# Patient Record
Sex: FEMALE | Race: WHITE | NOT HISPANIC OR LATINO | URBAN - METROPOLITAN AREA
[De-identification: names, ages, dates, MRNs, and addresses within clinical notes are randomized per-mention and may not be internally consistent; named-entity substitution may affect disease eponyms.]

---

## 2019-08-26 ENCOUNTER — INPATIENT (INPATIENT)
Facility: HOSPITAL | Age: 59
LOS: 3 days | Discharge: HOME | DRG: 392 | End: 2019-08-30
Attending: COLON & RECTAL SURGERY | Admitting: COLON & RECTAL SURGERY
Payer: COMMERCIAL

## 2019-08-26 VITALS
HEART RATE: 79 BPM | OXYGEN SATURATION: 95 % | DIASTOLIC BLOOD PRESSURE: 76 MMHG | TEMPERATURE: 98 F | RESPIRATION RATE: 17 BRPM | SYSTOLIC BLOOD PRESSURE: 173 MMHG

## 2019-08-26 DIAGNOSIS — Z90.49 ACQUIRED ABSENCE OF OTHER SPECIFIED PARTS OF DIGESTIVE TRACT: Chronic | ICD-10-CM

## 2019-08-26 DIAGNOSIS — Z98.890 OTHER SPECIFIED POSTPROCEDURAL STATES: Chronic | ICD-10-CM

## 2019-08-26 LAB
APTT BLD: 30.1 SEC — SIGNIFICANT CHANGE UP (ref 27.5–36.3)
BLD GP AB SCN SERPL QL: NEGATIVE — SIGNIFICANT CHANGE UP
INR BLD: 1.03 — SIGNIFICANT CHANGE UP (ref 0.88–1.16)
PROTHROM AB SERPL-ACNC: 11.6 SEC — SIGNIFICANT CHANGE UP (ref 10–12.9)
RH IG SCN BLD-IMP: POSITIVE — SIGNIFICANT CHANGE UP

## 2019-08-26 PROCEDURE — 93010 ELECTROCARDIOGRAM REPORT: CPT | Mod: NC

## 2019-08-26 PROCEDURE — 71046 X-RAY EXAM CHEST 2 VIEWS: CPT | Mod: 26

## 2019-08-26 PROCEDURE — 99285 EMERGENCY DEPT VISIT HI MDM: CPT

## 2019-08-26 RX ORDER — ENOXAPARIN SODIUM 100 MG/ML
40 INJECTION SUBCUTANEOUS EVERY 24 HOURS
Refills: 0 | Status: DISCONTINUED | OUTPATIENT
Start: 2019-08-26 | End: 2019-08-30

## 2019-08-26 RX ORDER — CEFTRIAXONE 500 MG/1
1000 INJECTION, POWDER, FOR SOLUTION INTRAMUSCULAR; INTRAVENOUS ONCE
Refills: 0 | Status: COMPLETED | OUTPATIENT
Start: 2019-08-26 | End: 2019-08-26

## 2019-08-26 RX ORDER — SODIUM CHLORIDE 9 MG/ML
1000 INJECTION INTRAMUSCULAR; INTRAVENOUS; SUBCUTANEOUS ONCE
Refills: 0 | Status: COMPLETED | OUTPATIENT
Start: 2019-08-26 | End: 2019-08-26

## 2019-08-26 RX ORDER — METRONIDAZOLE 500 MG
500 TABLET ORAL ONCE
Refills: 0 | Status: COMPLETED | OUTPATIENT
Start: 2019-08-26 | End: 2019-08-26

## 2019-08-26 RX ORDER — CEFTRIAXONE 500 MG/1
1000 INJECTION, POWDER, FOR SOLUTION INTRAMUSCULAR; INTRAVENOUS EVERY 24 HOURS
Refills: 0 | Status: DISCONTINUED | OUTPATIENT
Start: 2019-08-27 | End: 2019-08-28

## 2019-08-26 RX ORDER — SODIUM CHLORIDE 9 MG/ML
1000 INJECTION, SOLUTION INTRAVENOUS
Refills: 0 | Status: DISCONTINUED | OUTPATIENT
Start: 2019-08-26 | End: 2019-08-29

## 2019-08-26 RX ORDER — METRONIDAZOLE 500 MG
500 TABLET ORAL EVERY 8 HOURS
Refills: 0 | Status: DISCONTINUED | OUTPATIENT
Start: 2019-08-27 | End: 2019-08-28

## 2019-08-26 RX ORDER — MORPHINE SULFATE 50 MG/1
4 CAPSULE, EXTENDED RELEASE ORAL ONCE
Refills: 0 | Status: DISCONTINUED | OUTPATIENT
Start: 2019-08-26 | End: 2019-08-26

## 2019-08-26 RX ORDER — ACETAMINOPHEN 500 MG
1000 TABLET ORAL ONCE
Refills: 0 | Status: COMPLETED | OUTPATIENT
Start: 2019-08-26 | End: 2019-08-26

## 2019-08-26 RX ORDER — ZOLPIDEM TARTRATE 10 MG/1
5 TABLET ORAL AT BEDTIME
Refills: 0 | Status: DISCONTINUED | OUTPATIENT
Start: 2019-08-26 | End: 2019-08-27

## 2019-08-26 RX ORDER — METRONIDAZOLE 500 MG
TABLET ORAL
Refills: 0 | Status: DISCONTINUED | OUTPATIENT
Start: 2019-08-26 | End: 2019-08-28

## 2019-08-26 RX ADMIN — CEFTRIAXONE 1000 MILLIGRAM(S): 500 INJECTION, POWDER, FOR SOLUTION INTRAMUSCULAR; INTRAVENOUS at 19:51

## 2019-08-26 RX ADMIN — SODIUM CHLORIDE 100 MILLILITER(S): 9 INJECTION, SOLUTION INTRAVENOUS at 22:37

## 2019-08-26 RX ADMIN — CEFTRIAXONE 100 MILLIGRAM(S): 500 INJECTION, POWDER, FOR SOLUTION INTRAMUSCULAR; INTRAVENOUS at 19:43

## 2019-08-26 RX ADMIN — SODIUM CHLORIDE 1000 MILLILITER(S): 9 INJECTION INTRAMUSCULAR; INTRAVENOUS; SUBCUTANEOUS at 18:13

## 2019-08-26 RX ADMIN — MORPHINE SULFATE 4 MILLIGRAM(S): 50 CAPSULE, EXTENDED RELEASE ORAL at 20:30

## 2019-08-26 RX ADMIN — MORPHINE SULFATE 4 MILLIGRAM(S): 50 CAPSULE, EXTENDED RELEASE ORAL at 19:52

## 2019-08-26 RX ADMIN — Medication 400 MILLIGRAM(S): at 22:37

## 2019-08-26 RX ADMIN — Medication 100 MILLIGRAM(S): at 18:23

## 2019-08-26 RX ADMIN — ENOXAPARIN SODIUM 40 MILLIGRAM(S): 100 INJECTION SUBCUTANEOUS at 22:37

## 2019-08-26 RX ADMIN — MORPHINE SULFATE 4 MILLIGRAM(S): 50 CAPSULE, EXTENDED RELEASE ORAL at 18:23

## 2019-08-26 NOTE — ED ADULT NURSE NOTE - CHPI ED NUR SYMPTOMS NEG
no chills/no dizziness/no fever/no tingling/no decreased eating/drinking/no weakness/no nausea/no vomiting

## 2019-08-26 NOTE — ED PROVIDER NOTE - ATTENDING CONTRIBUTION TO CARE
60 yo hx of diverticulitis w lower abd pain for one week, treated for diverticulitis, w worsening pain, no f/c, diarrhea, GI bleed sx. Out pt +diverticulitis +abscess. Well appearing, nad, nc/at, lung cta, heart reg, abd soft, nt, ext no gross deformity, no gross neuro deficits, pending surgery eval for admission, abx, hds.

## 2019-08-26 NOTE — H&P ADULT - HISTORY OF PRESENT ILLNESS
59 F no sig PMH, PSH lap ramon (5yrs ago), hx of mild diverticulitis 4 wks ago (outpt tx Cipro/Flagyl) presenting w/ infraumbilical pain associated with subjective fever/chills for 6 days. Pt states sx started 4 wks ago as mild achey periumbilical pain, outpt CT scan at that time demonstrated minor sigmoid diverticulitis, pt placed on 10d of oral Cipro/Flagyl and symptoms resolved. Pt states abdominal pain recurred last Wednesday after a trip to [place] with worsening constant infraumbilical pain, sometimes stabbing in nature and associated with chills, so pt had repeat CT scan as outpt today demonstrated severe perforated diverticulitis with small 1.5 cm abscess, and presented to ED for further evaluation. Pt denies fevers, tolerating diet with reduced appetite, denies N/V/chest pain/dyspnea, passing flatus, last BM 2 days ago (usually daily BM), voiding w/increased frequency. Pt denies personal or family hx of IBD. Denies ever having EGD, last colonoscopy over 10 yrs ago per pt for screening, per pt results were normal.     PMH: Denies  PSH: Lap cholecystectomy (5yrs ago), Panniculectomy 4 yrs ago  Allergies: Penicillin-hives  Meds: denies  Social: smokes 2 cigarettes [id number] yrs, drinks 2 alcoholic beverages daily, denies illicit drug use,   Fam hx: father with brain cancer

## 2019-08-26 NOTE — ED ADULT NURSE NOTE - OBJECTIVE STATEMENT
59 year old F patient, A+OX3, ambulatory w steady gait presents to ED with c/o of abd pain - hx of diverticulitis & sent w CAT Scan rpt that states abscess.  No distress noted.  States many episode of diverticulitis, abd pain x1week after a vacation.  BReathing easily and unlabored.  DEnies chest pain, n/v/d/f.

## 2019-08-26 NOTE — H&P ADULT - NSHPLABSRESULTS_GEN_ALL_CORE
12.6   10.38 )-----------( 130      ( 26 Aug 2019 18:01 )             37.8   08-26    137  |  101  |  17  ----------------------------<  99  5.3   |  24  |  0.77    Ca    9.6      26 Aug 2019 18:01    TPro  7.8  /  Alb  4.1  /  TBili  0.4  /  DBili  x   /  AST  25  /  ALT  16  /  AlkPhos  73  08-26      CT [place] [place]

## 2019-08-26 NOTE — H&P ADULT - NSHPPHYSICALEXAM_GEN_ALL_CORE
General: NAD, resting comfortably  Neuro: AAOX3, EOMI, PERRLA, no scleral icterus  Pulm: CTAB, Nonlabored breathing  CV: S1/S2 normal, no murmurs  Abdomen: soft, mild distention, moderate infraumbilical and umbilical tenderness, no rebound, no guarding  Extremities: WWP, No LE edema b/l

## 2019-08-26 NOTE — ED ADULT NURSE NOTE - NSIMPLEMENTINTERV_GEN_ALL_ED
Implemented All Universal Safety Interventions:  [place] to call system. Call bell, personal items and telephone within reach. Instruct patient to call for assistance. Room bathroom lighting operational. Non-slip footwear when patient is off stretcher. Physically safe environment: no spills, clutter or unnecessary equipment. Stretcher in lowest position, wheels locked, appropriate side rails in place.

## 2019-08-26 NOTE — H&P ADULT - ASSESSMENT
59 F no sig PMH, PSH lap ramon (5yrs ago), hx of mild diverticulitis 4 wks ago (outpt tx Cipro/[id number]) presenting w/ perforated sigmoid diverticulitis w/1.5cm adjacent abscess.    Admit to General Surgery, Dr. [facility]  NPO/IVF  IV Ceftriaxone/Flagyl  Serial Abdominal Exams  Lovenox/SCDs for DVT prophylaxis  AM Labs  Discussed w/ Dr. Escalera

## 2019-08-26 NOTE — ED ADULT TRIAGE NOTE - ARRIVAL INFO ADDITIONAL COMMENTS
Pt walked into ED with c/o of lower abd pain, chills. Onset was 3 weeks ago. Pt was diagnosed with diverticulits with abd for 10 days that were completed. SHe denies fever, N+V+D. Pt denies taking medication for pain. No other medical problems.

## 2019-08-26 NOTE — ED PROVIDER NOTE - OBJECTIVE STATEMENT
60 y/o female with hx of diverticulitis c/o lower abd pain. Pt states sharp lower abd pain for past 1 wk. pt states treated earlier this month for diverticulitis and sx's improved with antibiotics and then worsened. no fever or chills. no back pain. no n/v/d. no bloody stool. no urinary sx's. no further complaints.

## 2019-08-26 NOTE — ED ADULT NURSE NOTE - NS ED NURSE LEVEL OF CONSCIOUSNESS SPEECH
Cardiology Follow Up Progress Note    Date of Service  5/10/2019    Attending Physician  Lexx Crowley M.D.    Chief Complaint   Preoperative cardiovascular examination, abnormal EKG, profound anemia pending GI studies.    STEWART Bishop is a 84 y.o. female admitted 5/7/2019 with above.    Interim Events  No significant changes noted from cardiac standpoint within the past 24 hours.    Review of Systems  Review of Systems   Constitutional: Negative.  Negative for chills and fever.   HENT: Negative.  Negative for hearing loss.    Eyes: Negative.    Respiratory: Negative.  Negative for cough and shortness of breath.    Cardiovascular: Negative.  Negative for chest pain, palpitations and leg swelling.   Gastrointestinal: Negative.  Negative for abdominal pain, nausea and vomiting.   Genitourinary: Negative.  Negative for dysuria and urgency.   Musculoskeletal: Negative.  Negative for myalgias.   Skin: Negative.  Negative for rash.   Neurological: Negative.  Negative for dizziness, weakness and headaches.   Hematological: Does not bruise/bleed easily.   Psychiatric/Behavioral: Negative.  The patient is not nervous/anxious.    (ROS essentially unchanged from 05/09/19 except sleepiness)    Vital signs in last 24 hours  Temp:  [36.4 °C (97.5 °F)-37.7 °C (99.9 °F)] 37.4 °C (99.3 °F)  Pulse:  [66-84] 84  Resp:  [14-20] 18  BP: (102-135)/(55-69) 115/68  SpO2:  [95 %-98 %] 95 %    Physical Exam  Physical Exam   Constitutional: She is oriented to person, place, and time. She appears well-developed and well-nourished.   HENT:   Head: Normocephalic and atraumatic.   Eyes: Pupils are equal, round, and reactive to light. Conjunctivae are normal.   Neck: Normal range of motion. Neck supple.   Cardiovascular: Normal rate and regular rhythm.    Pulmonary/Chest: Effort normal and breath sounds normal.   Abdominal: Soft. Bowel sounds are normal.   Musculoskeletal: Normal range of motion. She exhibits no edema.   Neurological: She is  alert and oriented to person, place, and time.   Skin: Skin is warm and dry.   Psychiatric: She has a normal mood and affect.   (Physical examination essentially unchanged from 05/09/19)    Lab Review  Lab Results   Component Value Date/Time    WBC 10.9 (H) 05/10/2019 03:20 AM    RBC 2.97 (L) 05/10/2019 03:20 AM    HEMOGLOBIN 7.3 (L) 05/10/2019 03:20 AM    HEMATOCRIT 24.3 (L) 05/10/2019 03:20 AM    MCV 81.8 05/10/2019 03:20 AM    MCH 24.6 (L) 05/10/2019 03:20 AM    MCHC 30.0 (L) 05/10/2019 03:20 AM    MPV 9.8 05/10/2019 03:20 AM      Lab Results   Component Value Date/Time    SODIUM 139 05/10/2019 03:20 AM    POTASSIUM 4.3 05/10/2019 03:20 AM    CHLORIDE 112 05/10/2019 03:20 AM    CO2 20 05/10/2019 03:20 AM    GLUCOSE 82 05/10/2019 03:20 AM    BUN 9 05/10/2019 03:20 AM    CREATININE 0.81 05/10/2019 03:20 AM    CREATININE 0.8 04/06/2006 09:05 AM      Lab Results   Component Value Date/Time    ASTSGOT 36 05/07/2019 10:40 AM    ALTSGPT 22 05/07/2019 10:40 AM     Lab Results   Component Value Date/Time    CHOLSTRLTOT 159 11/20/2018 10:14 AM    LDL 44 11/20/2018 10:14 AM    HDL 99 11/20/2018 10:14 AM    TRIGLYCERIDE 82 11/20/2018 10:14 AM    TROPONINI 0.42 (H) 05/09/2019 04:53 AM   (Above labs reviewed.)     Medications reviewed.      Current Facility-Administered Medications:   •  potassium chloride SA (Kdur) tablet 40 mEq, 40 mEq, Oral, BID, Lexx Crowley M.D., 40 mEq at 05/10/19 0510  •  citalopram (CELEXA) tablet 40 mg, 40 mg, Oral, DAILY, Loren Butler M.D., 40 mg at 05/10/19 0510  •  senna-docusate (PERICOLACE or SENOKOT S) 8.6-50 MG per tablet 2 Tab, 2 Tab, Oral, BID, Stopped at 05/08/19 1800 **AND** polyethylene glycol/lytes (MIRALAX) PACKET 1 Packet, 1 Packet, Oral, QDAY PRN **AND** magnesium hydroxide (MILK OF MAGNESIA) suspension 30 mL, 30 mL, Oral, QDAY PRN **AND** bisacodyl (DULCOLAX) suppository 10 mg, 10 mg, Rectal, QDAY PRN, Loren Butler M.D.  •  acetaminophen (TYLENOL) tablet 650 mg, 650 mg, Oral, Q6HRS  PRN, Loren Butler M.D.  •  omeprazole (PRILOSEC) capsule 20 mg, 20 mg, Oral, BID, Loren Butler M.D., 20 mg at 05/10/19 0510            Cardiac Imaging and Procedures Review  CARDIAC STUDIES/PROCEDURES:      ECHOCARDIOGRAM CONCLUSIONS (05/08/19)  Normal left ventricular systolic function.  Left ventricular ejection fraction is visually estimated to be 60-65%.  Aortic sclerosis without stenosis.  Mild to moderate aortic insufficiency.  Moderately severe mitral regurgitation.  Severely dilated left atrium.  Right heart pressures are consistent with moderate pulmonary hypertension.     ECHOCARDIOGRAM CONCLUSIONS (10/12/18)  Normal left ventricular size, thickness and systolic function.  Mildly dilated left atrium.  Trace aortic insufficiency.  Right ventricular systolic pressure is estimated to be 30 mmHg.  Compared to the images of the prior study done 12/29/2014, aortic   insufficiency is slightly better, and estimated right-sided pressures are lower.     EKG performed on (05/07/19) EKG shows sinus rhythm with anterolateral ST segment depression.     Laboratory examination on (05/08/19) shows troponin I level of 0.87 ng/mL.    MYOCARDIAL PERFUSION STUDY CONCLUSIONS (05/09/19)  No evidence of significant jeopardized viable myocardium or prior myocardial infarction.   Normal left ventricular size, ejection fraction, and wall motion.  ECG INTERPRETATION  Negative stress ECG for ischemia.  (study result reviewed)    Assessment/Plan  1.  Preoperative cardiovascular evaluation with abnormal EKG and abnormal troponin level:  She is clinically doing well and her myocardial perfusion imaging study was unremarkable. She may proceed with surgery from cardiac standpoint with low risk.  2.  Mitral regurgitation: She remains clinically stable with moderately severe. We will repeat an echocardiogram as outpatient.  3.  Hypertension:  Blood pressure is well controlled.  4.  History of transient ischemic attack/stroke:  She had no  recurrence of stroke-like symptoms.  5.  Gastrointestinal bleed/anemia:  Plan for EGD, colonoscopy in the near future.    Thank you for allowing me to participate in the care of this patient.  We will sign off and follow up in clinic.    Please contact me with any questions.    Teofilo Curtis M.D.   Cardiologist, [facility]  [phone]       Speaking Coherently

## 2019-08-26 NOTE — ED PROVIDER NOTE - CLINICAL SUMMARY MEDICAL DECISION MAKING FREE TEXT BOX
lower abd pain with outpt ct scan done today showing severe diverticulitis with small abscess. surgery consulted. labs, ecg and plan for admission. cefriaxone and flagyl given

## 2019-08-27 LAB
ANION GAP SERPL CALC-SCNC: 6 MMOL/L — SIGNIFICANT CHANGE UP (ref 5–17)
APPEARANCE UR: CLEAR — SIGNIFICANT CHANGE UP
BACTERIA # UR AUTO: PRESENT /HPF
BILIRUB UR-MCNC: NEGATIVE — SIGNIFICANT CHANGE UP
BLD GP AB SCN SERPL QL: NEGATIVE — SIGNIFICANT CHANGE UP
BUN SERPL-MCNC: 13 MG/DL — SIGNIFICANT CHANGE UP (ref 7–23)
CALCIUM SERPL-MCNC: 8.4 MG/DL — SIGNIFICANT CHANGE UP (ref 8.4–10.5)
CHLORIDE SERPL-SCNC: 109 MMOL/L — HIGH (ref 96–108)
CO2 SERPL-SCNC: 25 MMOL/L — SIGNIFICANT CHANGE UP (ref 22–31)
COLOR SPEC: YELLOW — SIGNIFICANT CHANGE UP
CREAT SERPL-MCNC: 0.88 MG/DL — SIGNIFICANT CHANGE UP (ref 0.5–1.3)
DIFF PNL FLD: NEGATIVE — SIGNIFICANT CHANGE UP
EPI CELLS # UR: SIGNIFICANT CHANGE UP /HPF (ref 0–5)
GLUCOSE SERPL-MCNC: 100 MG/DL — HIGH (ref 70–99)
GLUCOSE UR QL: NEGATIVE — SIGNIFICANT CHANGE UP
HCT VFR BLD CALC: 34 % — LOW (ref 34.5–45)
HCV AB S/CO SERPL IA: 0.12 S/CO — SIGNIFICANT CHANGE UP
HCV AB SERPL-IMP: SIGNIFICANT CHANGE UP
HGB BLD-MCNC: 10.9 G/DL — LOW (ref 11.5–15.5)
KETONES UR-MCNC: NEGATIVE — SIGNIFICANT CHANGE UP
LEUKOCYTE ESTERASE UR-ACNC: NEGATIVE — SIGNIFICANT CHANGE UP
MAGNESIUM SERPL-MCNC: 2 MG/DL — SIGNIFICANT CHANGE UP (ref 1.6–2.6)
MCHC RBC-ENTMCNC: 32.1 GM/DL — SIGNIFICANT CHANGE UP (ref 32–36)
MCHC RBC-ENTMCNC: 32.3 PG — SIGNIFICANT CHANGE UP (ref 27–34)
MCV RBC AUTO: 100.9 FL — HIGH (ref 80–100)
NITRITE UR-MCNC: NEGATIVE — SIGNIFICANT CHANGE UP
NRBC # BLD: 0 /100 WBCS — SIGNIFICANT CHANGE UP (ref 0–0)
PH UR: 5.5 — SIGNIFICANT CHANGE UP (ref 5–8)
PHOSPHATE SERPL-MCNC: 4.2 MG/DL — SIGNIFICANT CHANGE UP (ref 2.5–4.5)
PLATELET # BLD AUTO: 115 K/UL — LOW (ref 150–400)
POTASSIUM SERPL-MCNC: 3.9 MMOL/L — SIGNIFICANT CHANGE UP (ref 3.5–5.3)
POTASSIUM SERPL-SCNC: 3.9 MMOL/L — SIGNIFICANT CHANGE UP (ref 3.5–5.3)
PROT UR-MCNC: ABNORMAL MG/DL
RBC # BLD: 3.37 M/UL — LOW (ref 3.8–5.2)
RBC # FLD: 12.6 % — SIGNIFICANT CHANGE UP (ref 10.3–14.5)
RBC CASTS # UR COMP ASSIST: < 5 /HPF — SIGNIFICANT CHANGE UP
RH IG SCN BLD-IMP: POSITIVE — SIGNIFICANT CHANGE UP
SODIUM SERPL-SCNC: 140 MMOL/L — SIGNIFICANT CHANGE UP (ref 135–145)
SP GR SPEC: 1.01 — SIGNIFICANT CHANGE UP (ref 1–1.03)
UROBILINOGEN FLD QL: 0.2 E.U./DL — SIGNIFICANT CHANGE UP
WBC # BLD: 6.31 K/UL — SIGNIFICANT CHANGE UP (ref 3.8–10.5)
WBC # FLD AUTO: 6.31 K/UL — SIGNIFICANT CHANGE UP (ref 3.8–10.5)
WBC UR QL: < 5 /HPF — SIGNIFICANT CHANGE UP

## 2019-08-27 PROCEDURE — 74019 RADEX ABDOMEN 2 VIEWS: CPT | Mod: 26

## 2019-08-27 RX ORDER — HYDROMORPHONE HYDROCHLORIDE 2 MG/ML
1 INJECTION INTRAMUSCULAR; INTRAVENOUS; SUBCUTANEOUS EVERY 4 HOURS
Refills: 0 | Status: DISCONTINUED | OUTPATIENT
Start: 2019-08-27 | End: 2019-08-27

## 2019-08-27 RX ORDER — HYDROMORPHONE HYDROCHLORIDE 2 MG/ML
0.5 INJECTION INTRAMUSCULAR; INTRAVENOUS; SUBCUTANEOUS EVERY 4 HOURS
Refills: 0 | Status: DISCONTINUED | OUTPATIENT
Start: 2019-08-27 | End: 2019-08-30

## 2019-08-27 RX ORDER — HYDROMORPHONE HYDROCHLORIDE 2 MG/ML
0.5 INJECTION INTRAMUSCULAR; INTRAVENOUS; SUBCUTANEOUS EVERY 6 HOURS
Refills: 0 | Status: DISCONTINUED | OUTPATIENT
Start: 2019-08-27 | End: 2019-08-27

## 2019-08-27 RX ORDER — ONDANSETRON 8 MG/1
8 TABLET, FILM COATED ORAL EVERY 6 HOURS
Refills: 0 | Status: DISCONTINUED | OUTPATIENT
Start: 2019-08-27 | End: 2019-08-30

## 2019-08-27 RX ORDER — KETOROLAC TROMETHAMINE 30 MG/ML
15 SYRINGE (ML) INJECTION EVERY 6 HOURS
Refills: 0 | Status: DISCONTINUED | OUTPATIENT
Start: 2019-08-27 | End: 2019-08-30

## 2019-08-27 RX ORDER — SODIUM CHLORIDE 9 MG/ML
250 INJECTION, SOLUTION INTRAVENOUS ONCE
Refills: 0 | Status: COMPLETED | OUTPATIENT
Start: 2019-08-27 | End: 2019-08-27

## 2019-08-27 RX ORDER — DIAZEPAM 5 MG
5 TABLET ORAL AT BEDTIME
Refills: 0 | Status: DISCONTINUED | OUTPATIENT
Start: 2019-08-27 | End: 2019-08-29

## 2019-08-27 RX ORDER — KETOROLAC TROMETHAMINE 30 MG/ML
30 SYRINGE (ML) INJECTION EVERY 6 HOURS
Refills: 0 | Status: DISCONTINUED | OUTPATIENT
Start: 2019-08-27 | End: 2019-08-27

## 2019-08-27 RX ORDER — HYDROMORPHONE HYDROCHLORIDE 2 MG/ML
0.3 INJECTION INTRAMUSCULAR; INTRAVENOUS; SUBCUTANEOUS EVERY 4 HOURS
Refills: 0 | Status: DISCONTINUED | OUTPATIENT
Start: 2019-08-27 | End: 2019-08-30

## 2019-08-27 RX ORDER — DIAZEPAM 5 MG
2 TABLET ORAL ONCE
Refills: 0 | Status: DISCONTINUED | OUTPATIENT
Start: 2019-08-27 | End: 2019-08-27

## 2019-08-27 RX ORDER — METOCLOPRAMIDE HCL 10 MG
10 TABLET ORAL ONCE
Refills: 0 | Status: COMPLETED | OUTPATIENT
Start: 2019-08-27 | End: 2019-08-27

## 2019-08-27 RX ORDER — ACETAMINOPHEN 500 MG
1000 TABLET ORAL ONCE
Refills: 0 | Status: COMPLETED | OUTPATIENT
Start: 2019-08-27 | End: 2019-08-27

## 2019-08-27 RX ORDER — ONDANSETRON 8 MG/1
4 TABLET, FILM COATED ORAL EVERY 6 HOURS
Refills: 0 | Status: DISCONTINUED | OUTPATIENT
Start: 2019-08-27 | End: 2019-08-27

## 2019-08-27 RX ADMIN — Medication 15 MILLIGRAM(S): at 16:46

## 2019-08-27 RX ADMIN — Medication 1000 MILLIGRAM(S): at 00:01

## 2019-08-27 RX ADMIN — SODIUM CHLORIDE 100 MILLILITER(S): 9 INJECTION, SOLUTION INTRAVENOUS at 17:26

## 2019-08-27 RX ADMIN — Medication 15 MILLIGRAM(S): at 21:13

## 2019-08-27 RX ADMIN — Medication 400 MILLIGRAM(S): at 16:47

## 2019-08-27 RX ADMIN — HYDROMORPHONE HYDROCHLORIDE 1 MILLIGRAM(S): 2 INJECTION INTRAMUSCULAR; INTRAVENOUS; SUBCUTANEOUS at 10:30

## 2019-08-27 RX ADMIN — Medication 30 MILLIGRAM(S): at 09:46

## 2019-08-27 RX ADMIN — Medication 100 MILLIGRAM(S): at 09:46

## 2019-08-27 RX ADMIN — Medication 5 MILLIGRAM(S): at 21:14

## 2019-08-27 RX ADMIN — Medication 400 MILLIGRAM(S): at 06:25

## 2019-08-27 RX ADMIN — ENOXAPARIN SODIUM 40 MILLIGRAM(S): 100 INJECTION SUBCUTANEOUS at 18:35

## 2019-08-27 RX ADMIN — SODIUM CHLORIDE 1500 MILLILITER(S): 9 INJECTION, SOLUTION INTRAVENOUS at 17:25

## 2019-08-27 RX ADMIN — Medication 1000 MILLIGRAM(S): at 22:42

## 2019-08-27 RX ADMIN — Medication 15 MILLIGRAM(S): at 17:30

## 2019-08-27 RX ADMIN — Medication 100 MILLIGRAM(S): at 02:09

## 2019-08-27 RX ADMIN — Medication 1000 MILLIGRAM(S): at 06:52

## 2019-08-27 RX ADMIN — ONDANSETRON 4 MILLIGRAM(S): 8 TABLET, FILM COATED ORAL at 16:47

## 2019-08-27 RX ADMIN — Medication 2 MILLIGRAM(S): at 03:41

## 2019-08-27 RX ADMIN — CEFTRIAXONE 100 MILLIGRAM(S): 500 INJECTION, POWDER, FOR SOLUTION INTRAMUSCULAR; INTRAVENOUS at 19:35

## 2019-08-27 RX ADMIN — Medication 1000 MILLIGRAM(S): at 17:30

## 2019-08-27 RX ADMIN — Medication 100 MILLIGRAM(S): at 18:34

## 2019-08-27 RX ADMIN — Medication 10 MILLIGRAM(S): at 17:25

## 2019-08-27 RX ADMIN — ZOLPIDEM TARTRATE 5 MILLIGRAM(S): 10 TABLET ORAL at 00:11

## 2019-08-27 RX ADMIN — HYDROMORPHONE HYDROCHLORIDE 1 MILLIGRAM(S): 2 INJECTION INTRAMUSCULAR; INTRAVENOUS; SUBCUTANEOUS at 09:46

## 2019-08-27 RX ADMIN — Medication 30 MILLIGRAM(S): at 10:30

## 2019-08-27 RX ADMIN — Medication 400 MILLIGRAM(S): at 21:13

## 2019-08-27 RX ADMIN — Medication 15 MILLIGRAM(S): at 22:42

## 2019-08-27 NOTE — PROGRESS NOTE ADULT - ASSESSMENT
59F past hx of lap ramon (5yrs ago), hx of mild diverticulitis 4 wks ago (outpt tx Cipro/[id number]) presenting w/ perforated sigmoid diverticulitis w/1.5cm adjacent abscess.    NPO/IVF  IV Ceftriaxone/Flagyl  Lovenox/SCDs for DVT prophylaxis  AM Labs  Serial abdominal exams.

## 2019-08-27 NOTE — CHART NOTE - NSCHARTNOTEFT_GEN_A_CORE
Chart note for Serial Abdominal Exam:     Subjective:   Pt seen and examined at bedside. Pt is doing well, resting comfortably on bed. Pain controlled. No nausea or vomiting. No complaints at this time.    Vital Signs Last 24 Hrs  T(C): 35.9 (26 Aug 2019 21:43), Max: 36.8 (26 Aug 2019 17:26)  T(F): 96.7 (26 Aug 2019 21:43), Max: 98.3 (26 Aug 2019 17:26)  HR: 68 (26 Aug 2019 21:43) (67 - 79)  BP: 143/83 (26 Aug 2019 21:43) (141/89 - 173/76)  RR: 18 (26 Aug 2019 21:43) (17 - 18)  SpO2: 67% (26 Aug 2019 21:43) (67% - 95%)    Physical Exam:  General: NAD  Pulmonary: Nonlabored breathing, no respiratory distress  Abdominal: soft, mildly distended, mild tenderness in the LLQ and infraumbilically, no rebound or guarding.   Extremities: warm, well perfused. SCDs in place.     Assessment:  59 F no sig PMH, PSH lap ramon (5yrs ago), hx of mild diverticulitis 4 wks ago (outpt tx Cipro/[id number]) presenting w/ perforated sigmoid diverticulitis w/1.5cm adjacent abscess.    Plan:  NPO/IVF  IV Ceftriaxone/Flagyl  Lovenox/SCDs for DVT prophylaxis  AM Labs  Will continue to monitor and perform serial abdominal exams

## 2019-08-27 NOTE — PROGRESS NOTE ADULT - SUBJECTIVE AND OBJECTIVE BOX
SUBJECTIVE: Pt seen & examined at bedside by chief resident. Pt states that she did not get much sleep last night because she was not given the correct medications for her sleep. Does not believe that the pain was a major component of her insomnia - states that the pain is well controlled. Denies any N/V and says that she is hungry. Has not been passing flatus and no BM.    Vital Signs Last 24 Hrs  T(C): 36.7 (27 Aug 2019 05:26), Max: 36.8 (26 Aug 2019 17:26)  T(F): 98 (27 Aug 2019 05:26), Max: 98.3 (26 Aug 2019 17:26)  HR: 62 (27 Aug 2019 05:26) (62 - 79)  BP: 100/66 (27 Aug 2019 05:26) (100/66 - 173/76)  BP(mean): --  RR: 18 (27 Aug 2019 05:26) (17 - 18)  SpO2: 96% (27 Aug 2019 05:26) (67% - 96%)    General: NAD  Pulmonary: Nonlabored breathing, no respiratory distress  Abdominal: soft, mildly distended, mild tenderness in the LLQ and infraumbilically, no rebound or guarding.   Extremities: warm, well perfused. SCDs in place.    I&O's Detail    26 Aug 2019 07:01  -  27 Aug 2019 06:10  --------------------------------------------------------  IN:    IV PiggyBack: 100 mL  Total IN: 100 mL    OUT:  Total OUT: 0 mL    Total NET: 100 mL      08-26    137  |  101  |  17  ----------------------------<  99  5.3   |  24  |  0.77    Ca    9.6      26 Aug 2019 18:01    TPro  7.8  /  Alb  4.1  /  TBili  0.4  /  DBili  x   /  AST  25  /  ALT  16  /  AlkPhos  73  08-26                            12.6   10.38 )-----------( 130      ( 26 Aug 2019 18:01 )             37.8 SUBJECTIVE: Pt seen & examined at bedside by chief resident. Pt states that she did not get much sleep last night because she was not given the correct medications for her sleep. Does not believe that the pain was a major component of her insomnia - states that the pain is well controlled. Also has a headache this AM. Denies any N/V and says that she is hungry. Has not been passing flatus and no BM.    Vital Signs Last 24 Hrs  T(C): 36.7 (27 Aug 2019 05:26), Max: 36.8 (26 Aug 2019 17:26)  T(F): 98 (27 Aug 2019 05:26), Max: 98.3 (26 Aug 2019 17:26)  HR: 62 (27 Aug 2019 05:26) (62 - 79)  BP: 100/66 (27 Aug 2019 05:26) (100/66 - 173/76)  BP(mean): --  RR: 18 (27 Aug 2019 05:26) (17 - 18)  SpO2: 96% (27 Aug 2019 05:26) (67% - 96%)    General: NAD  Pulmonary: Nonlabored breathing, no respiratory distress  Abdominal: soft, mildly distended, mild tenderness in the LLQ and infraumbilically, no rebound or guarding.   Extremities: warm, well perfused. SCDs in place.    I&O's Detail    26 Aug 2019 07:01  -  27 Aug 2019 06:10  --------------------------------------------------------  IN:    IV PiggyBack: 100 mL  Total IN: 100 mL    OUT:  Total OUT: 0 mL    Total NET: 100 mL      08-26    137  |  101  |  17  ----------------------------<  99  5.3   |  24  |  0.77    Ca    9.6      26 Aug 2019 18:01    TPro  7.8  /  Alb  4.1  /  TBili  0.4  /  DBili  x   /  AST  25  /  ALT  16  /  AlkPhos  73  08-26                            12.6   10.38 )-----------( 130      ( 26 Aug 2019 18:01 )             37.8

## 2019-08-28 LAB
ANION GAP SERPL CALC-SCNC: 12 MMOL/L — SIGNIFICANT CHANGE UP (ref 5–17)
BUN SERPL-MCNC: 13 MG/DL — SIGNIFICANT CHANGE UP (ref 7–23)
CALCIUM SERPL-MCNC: 8.7 MG/DL — SIGNIFICANT CHANGE UP (ref 8.4–10.5)
CHLORIDE SERPL-SCNC: 108 MMOL/L — SIGNIFICANT CHANGE UP (ref 96–108)
CO2 SERPL-SCNC: 22 MMOL/L — SIGNIFICANT CHANGE UP (ref 22–31)
CREAT SERPL-MCNC: 0.75 MG/DL — SIGNIFICANT CHANGE UP (ref 0.5–1.3)
GLUCOSE SERPL-MCNC: 92 MG/DL — SIGNIFICANT CHANGE UP (ref 70–99)
HCT VFR BLD CALC: 32.6 % — LOW (ref 34.5–45)
HGB BLD-MCNC: 10.5 G/DL — LOW (ref 11.5–15.5)
MAGNESIUM SERPL-MCNC: 2 MG/DL — SIGNIFICANT CHANGE UP (ref 1.6–2.6)
MCHC RBC-ENTMCNC: 32.2 GM/DL — SIGNIFICANT CHANGE UP (ref 32–36)
MCHC RBC-ENTMCNC: 32.3 PG — SIGNIFICANT CHANGE UP (ref 27–34)
MCV RBC AUTO: 100.3 FL — HIGH (ref 80–100)
NRBC # BLD: 0 /100 WBCS — SIGNIFICANT CHANGE UP (ref 0–0)
PHOSPHATE SERPL-MCNC: 4.4 MG/DL — SIGNIFICANT CHANGE UP (ref 2.5–4.5)
PLATELET # BLD AUTO: 127 K/UL — LOW (ref 150–400)
POTASSIUM SERPL-MCNC: 4 MMOL/L — SIGNIFICANT CHANGE UP (ref 3.5–5.3)
POTASSIUM SERPL-SCNC: 4 MMOL/L — SIGNIFICANT CHANGE UP (ref 3.5–5.3)
RBC # BLD: 3.25 M/UL — LOW (ref 3.8–5.2)
RBC # FLD: 12.4 % — SIGNIFICANT CHANGE UP (ref 10.3–14.5)
SODIUM SERPL-SCNC: 142 MMOL/L — SIGNIFICANT CHANGE UP (ref 135–145)
WBC # BLD: 7.1 K/UL — SIGNIFICANT CHANGE UP (ref 3.8–10.5)
WBC # FLD AUTO: 7.1 K/UL — SIGNIFICANT CHANGE UP (ref 3.8–10.5)

## 2019-08-28 PROCEDURE — 95180 RAPID DESENSITIZATION: CPT | Mod: GC

## 2019-08-28 RX ORDER — METOCLOPRAMIDE HCL 10 MG
10 TABLET ORAL ONCE
Refills: 0 | Status: COMPLETED | OUTPATIENT
Start: 2019-08-28 | End: 2019-08-28

## 2019-08-28 RX ORDER — SODIUM CHLORIDE 9 MG/ML
0.5 INJECTION INTRAMUSCULAR; INTRAVENOUS; SUBCUTANEOUS ONCE
Refills: 0 | Status: DISCONTINUED | OUTPATIENT
Start: 2019-08-28 | End: 2019-08-30

## 2019-08-28 RX ORDER — DIAZEPAM 5 MG
5 TABLET ORAL AT BEDTIME
Refills: 0 | Status: DISCONTINUED | OUTPATIENT
Start: 2019-08-28 | End: 2019-08-28

## 2019-08-28 RX ORDER — MEROPENEM 1 G/30ML
1000 INJECTION INTRAVENOUS EVERY 8 HOURS
Refills: 0 | Status: DISCONTINUED | OUTPATIENT
Start: 2019-08-28 | End: 2019-08-30

## 2019-08-28 RX ORDER — ACETAMINOPHEN 500 MG
1000 TABLET ORAL ONCE
Refills: 0 | Status: COMPLETED | OUTPATIENT
Start: 2019-08-28 | End: 2019-08-28

## 2019-08-28 RX ORDER — SCOPALAMINE 1 MG/3D
1 PATCH, EXTENDED RELEASE TRANSDERMAL
Refills: 0 | Status: DISCONTINUED | OUTPATIENT
Start: 2019-08-28 | End: 2019-08-30

## 2019-08-28 RX ORDER — BENZYLPENICILLOYL POLYLYSINE 0.25 ML
0.25 AMPUL (ML) INTRADERMAL ONCE
Refills: 0 | Status: DISCONTINUED | OUTPATIENT
Start: 2019-08-28 | End: 2019-08-30

## 2019-08-28 RX ORDER — PENICILLIN G POTASSIUM 5000000 [IU]/1
1 POWDER, FOR SOLUTION INTRAMUSCULAR; INTRAPLEURAL; INTRATHECAL; INTRAVENOUS ONCE
Refills: 0 | Status: DISCONTINUED | OUTPATIENT
Start: 2019-08-28 | End: 2019-08-30

## 2019-08-28 RX ADMIN — SCOPALAMINE 1 PATCH: 1 PATCH, EXTENDED RELEASE TRANSDERMAL at 13:05

## 2019-08-28 RX ADMIN — Medication 15 MILLIGRAM(S): at 17:30

## 2019-08-28 RX ADMIN — Medication 15 MILLIGRAM(S): at 22:36

## 2019-08-28 RX ADMIN — Medication 10 MILLIGRAM(S): at 14:34

## 2019-08-28 RX ADMIN — Medication 400 MILLIGRAM(S): at 13:07

## 2019-08-28 RX ADMIN — Medication 15 MILLIGRAM(S): at 05:40

## 2019-08-28 RX ADMIN — Medication 15 MILLIGRAM(S): at 10:40

## 2019-08-28 RX ADMIN — ENOXAPARIN SODIUM 40 MILLIGRAM(S): 100 INJECTION SUBCUTANEOUS at 18:16

## 2019-08-28 RX ADMIN — ONDANSETRON 8 MILLIGRAM(S): 8 TABLET, FILM COATED ORAL at 12:24

## 2019-08-28 RX ADMIN — Medication 15 MILLIGRAM(S): at 22:07

## 2019-08-28 RX ADMIN — Medication 5 MILLIGRAM(S): at 22:07

## 2019-08-28 RX ADMIN — Medication 15 MILLIGRAM(S): at 17:45

## 2019-08-28 RX ADMIN — Medication 1000 MILLIGRAM(S): at 13:22

## 2019-08-28 RX ADMIN — Medication 15 MILLIGRAM(S): at 05:54

## 2019-08-28 RX ADMIN — ONDANSETRON 8 MILLIGRAM(S): 8 TABLET, FILM COATED ORAL at 00:51

## 2019-08-28 RX ADMIN — Medication 100 MILLIGRAM(S): at 10:40

## 2019-08-28 RX ADMIN — ONDANSETRON 8 MILLIGRAM(S): 8 TABLET, FILM COATED ORAL at 06:57

## 2019-08-28 RX ADMIN — Medication 100 MILLIGRAM(S): at 02:16

## 2019-08-28 RX ADMIN — MEROPENEM 100 MILLIGRAM(S): 1 INJECTION INTRAVENOUS at 22:07

## 2019-08-28 RX ADMIN — SCOPALAMINE 1 PATCH: 1 PATCH, EXTENDED RELEASE TRANSDERMAL at 19:00

## 2019-08-28 RX ADMIN — Medication 15 MILLIGRAM(S): at 10:55

## 2019-08-28 NOTE — CHART NOTE - NSCHARTNOTEFT_GEN_A_CORE
Penicillin Allergy Skin Testing    DX: Reported PCN drug allergy -Reported as rash/hives at age 4yrs old    no hx of colmenares reinier/TEN  no steroids/antihistamines recieved in last 48hrs      Exam:  Gen: NAD  HEENT: MMM, no stridor, lip swelling, facial edema  Lungs: CTA bl, no wheeze, rales, rhonchi  Skin Exam: Skin no lesions/rash/urticaria  Ext: No C/C/E    1) Informed consent was obtained and time-out was performed.    2) Scratch test: _____positive__________  * Histamine: 10 mm   * Saline diluent: 0 mm  * Pre-Pen: 5 mm  * Penicillin  mm    3) Intradermal test: ___deferred____________  * Saline diluent: 0 mm  * Pre-pen #1: 0 mm (growth beyond original size)  * Pre-pen #2: 0 mm ( growth beyond original size)  * Penicillin G #1: 0 mm (growth beyond original size)  * Penicillin G #2: 0 mm ( growth beyond original size)       Results: positive Penicillin Allergy Skin Testing    Results were explained to the patient and communicated with primary team.       Procedure was performed by Dr. Thong Hickey    Time face to face 60 minutes with >50% on counseling and coordination of care.

## 2019-08-28 NOTE — PROGRESS NOTE ADULT - SUBJECTIVE AND OBJECTIVE BOX
SUBJECTIVE: Patient seen and examined bedside by chief resident.  nausea has improved since yesterday however is still slightly present. last nausea medication was before she went to bed. no vomiting today. some abdominal pain upon movement. has ambulated to bathroom. +BM    cefTRIAXone   IVPB 1000 milliGRAM(s) IV Intermittent every 24 hours  enoxaparin Injectable 40 milliGRAM(s) SubCutaneous every 24 hours  metroNIDAZOLE  IVPB      metroNIDAZOLE  IVPB 500 milliGRAM(s) IV Intermittent every 8 hours      Vital Signs Last 24 Hrs  T(C): 36.7 (28 Aug 2019 05:51), Max: 37.2 (27 Aug 2019 09:59)  T(F): 98 (28 Aug 2019 05:51), Max: 99 (27 Aug 2019 09:59)  HR: 72 (28 Aug 2019 05:51) (60 - 72)  BP: 153/86 (28 Aug 2019 05:51) (121/78 - 155/88)  BP(mean): --  RR: 17 (28 Aug 2019 05:51) (17 - 18)  SpO2: 97% (28 Aug 2019 05:51) (96% - 99%)  I&O's Detail    27 Aug 2019 07:01  -  28 Aug 2019 07:00  --------------------------------------------------------  IN:  Total IN: 0 mL    OUT:    Voided: 100 mL  Total OUT: 100 mL    Total NET: -100 mL          General: NAD, resting comfortably in bed  C/V: NSR  Pulm: Nonlabored breathing, no respiratory distress  Abd: soft, ND, LLQ TTP however exam improving  Extrem: WWP, no edema, SCDs in place        LABS:                        10.5   7.10  )-----------( 127      ( 28 Aug 2019 05:58 )             32.6     08-    142  |  108  |  13  ----------------------------<  92  4.0   |  22  |  0.75    Ca    8.7      28 Aug 2019 05:58  Phos  4.4       Mg     2.0         TPro  7.8  /  Alb  4.1  /  TBili  0.4  /  DBili  x   /  AST  25  /  ALT  16  /  AlkPhos  73  -    PT/INR - ( 26 Aug 2019 18:20 )   PT: 11.6 sec;   INR: 1.03          PTT - ( 26 Aug 2019 18:20 )  PTT:30.1 sec  Urinalysis Basic - ( 27 Aug 2019 06:46 )    Color: Yellow / Appearance: Clear / S.015 / pH: x  Gluc: x / Ketone: NEGATIVE  / Bili: Negative / Urobili: 0.2 E.U./dL   Blood: x / Protein: Trace mg/dL / Nitrite: NEGATIVE   Leuk Esterase: NEGATIVE / RBC: < 5 /HPF / WBC < 5 /HPF   Sq Epi: x / Non Sq Epi: 0-5 /HPF / Bacteria: Present /HPF        RADIOLOGY & ADDITIONAL STUDIES:

## 2019-08-28 NOTE — PROGRESS NOTE ADULT - SUBJECTIVE AND OBJECTIVE BOX
some improvement  but nauseous  AVSS  ABD soft  less tender in LLQ    alergy testing  OOB  Incentive spirometer  NPO for now

## 2019-08-28 NOTE — PROGRESS NOTE ADULT - ASSESSMENT
59 F no sig PMH, PSH lap ramon (5yrs ago), hx of mild diverticulitis 4 wks ago (outpt tx Cipro/[id number]) presenting w/ perforated sigmoid diverticulitis w/1.5cm adjacent abscess.    NPO/IVF  IV Ceftriaxone/Flagyl  Serial Abdominal Exams  Lovenox/SCDs for DVT prophylaxis  AM Labs

## 2019-08-29 LAB
ANION GAP SERPL CALC-SCNC: 14 MMOL/L — SIGNIFICANT CHANGE UP (ref 5–17)
BUN SERPL-MCNC: 11 MG/DL — SIGNIFICANT CHANGE UP (ref 7–23)
CALCIUM SERPL-MCNC: 8.5 MG/DL — SIGNIFICANT CHANGE UP (ref 8.4–10.5)
CHLORIDE SERPL-SCNC: 107 MMOL/L — SIGNIFICANT CHANGE UP (ref 96–108)
CO2 SERPL-SCNC: 20 MMOL/L — LOW (ref 22–31)
CREAT SERPL-MCNC: 0.74 MG/DL — SIGNIFICANT CHANGE UP (ref 0.5–1.3)
GLUCOSE SERPL-MCNC: 88 MG/DL — SIGNIFICANT CHANGE UP (ref 70–99)
HCT VFR BLD CALC: 32.4 % — LOW (ref 34.5–45)
HGB BLD-MCNC: 10.9 G/DL — LOW (ref 11.5–15.5)
MAGNESIUM SERPL-MCNC: 1.8 MG/DL — SIGNIFICANT CHANGE UP (ref 1.6–2.6)
MCHC RBC-ENTMCNC: 33 PG — SIGNIFICANT CHANGE UP (ref 27–34)
MCHC RBC-ENTMCNC: 33.6 GM/DL — SIGNIFICANT CHANGE UP (ref 32–36)
MCV RBC AUTO: 98.2 FL — SIGNIFICANT CHANGE UP (ref 80–100)
NRBC # BLD: 0 /100 WBCS — SIGNIFICANT CHANGE UP (ref 0–0)
PHOSPHATE SERPL-MCNC: 3.5 MG/DL — SIGNIFICANT CHANGE UP (ref 2.5–4.5)
PLATELET # BLD AUTO: 147 K/UL — LOW (ref 150–400)
POTASSIUM SERPL-MCNC: 4.1 MMOL/L — SIGNIFICANT CHANGE UP (ref 3.5–5.3)
POTASSIUM SERPL-SCNC: 4.1 MMOL/L — SIGNIFICANT CHANGE UP (ref 3.5–5.3)
RBC # BLD: 3.3 M/UL — LOW (ref 3.8–5.2)
RBC # FLD: 12.5 % — SIGNIFICANT CHANGE UP (ref 10.3–14.5)
SODIUM SERPL-SCNC: 141 MMOL/L — SIGNIFICANT CHANGE UP (ref 135–145)
WBC # BLD: 10.86 K/UL — HIGH (ref 3.8–10.5)
WBC # FLD AUTO: 10.86 K/UL — HIGH (ref 3.8–10.5)

## 2019-08-29 PROCEDURE — 99222 1ST HOSP IP/OBS MODERATE 55: CPT | Mod: GC

## 2019-08-29 RX ORDER — ACETAMINOPHEN 500 MG
1000 TABLET ORAL ONCE
Refills: 0 | Status: COMPLETED | OUTPATIENT
Start: 2019-08-29 | End: 2019-08-29

## 2019-08-29 RX ORDER — TRAZODONE HCL 50 MG
400 TABLET ORAL AT BEDTIME
Refills: 0 | Status: DISCONTINUED | OUTPATIENT
Start: 2019-08-29 | End: 2019-08-30

## 2019-08-29 RX ORDER — TRAZODONE HCL 50 MG
400 TABLET ORAL
Qty: 0 | Refills: 0 | DISCHARGE

## 2019-08-29 RX ADMIN — ONDANSETRON 8 MILLIGRAM(S): 8 TABLET, FILM COATED ORAL at 02:36

## 2019-08-29 RX ADMIN — MEROPENEM 100 MILLIGRAM(S): 1 INJECTION INTRAVENOUS at 21:15

## 2019-08-29 RX ADMIN — Medication 15 MILLIGRAM(S): at 18:18

## 2019-08-29 RX ADMIN — ONDANSETRON 8 MILLIGRAM(S): 8 TABLET, FILM COATED ORAL at 15:39

## 2019-08-29 RX ADMIN — Medication 15 MILLIGRAM(S): at 06:00

## 2019-08-29 RX ADMIN — Medication 400 MILLIGRAM(S): at 13:54

## 2019-08-29 RX ADMIN — Medication 400 MILLIGRAM(S): at 21:17

## 2019-08-29 RX ADMIN — MEROPENEM 100 MILLIGRAM(S): 1 INJECTION INTRAVENOUS at 15:35

## 2019-08-29 RX ADMIN — MEROPENEM 100 MILLIGRAM(S): 1 INJECTION INTRAVENOUS at 05:29

## 2019-08-29 RX ADMIN — Medication 15 MILLIGRAM(S): at 12:42

## 2019-08-29 RX ADMIN — SCOPALAMINE 1 PATCH: 1 PATCH, EXTENDED RELEASE TRANSDERMAL at 07:00

## 2019-08-29 RX ADMIN — Medication 15 MILLIGRAM(S): at 05:28

## 2019-08-29 RX ADMIN — ENOXAPARIN SODIUM 40 MILLIGRAM(S): 100 INJECTION SUBCUTANEOUS at 18:19

## 2019-08-29 NOTE — CHART NOTE - NSCHARTNOTEFT_GEN_A_CORE
Infectious Diseases Anti-infective Approval Note    Medication:  meropenem  Dose:  1 gram  Route:  IV   Frequency:  q8hrs  Duration:  5 days     Dose may be adjusted as needed for alterations in renal function.    *THIS IS NOT AN INFECTIOUS DISEASES CONSULTATION*

## 2019-08-29 NOTE — CONSULT NOTE ADULT - ATTENDING COMMENTS
I have reviewed the medical record, including laboratory and radiographic studies, interviewed and examined the patient and discussed the plan with Dr. Pete, the ID Resident.  Agree with above. Please recall if further ID input is desired – ID Team 1.

## 2019-08-29 NOTE — CONSULT NOTE ADULT - SUBJECTIVE AND OBJECTIVE BOX
HPI:  59 F no sig PMH, PSH lap ramon (5yrs ago), hx of mild diverticulitis 4 wks ago (outpt tx Cipro/Flagyl) presenting w/ infraumbilical pain associated with subjective fever/chills for 6 days. Pt states sx started 4 wks ago as mild achey periumbilical pain, outpt CT scan at that time demonstrated minor sigmoid diverticulitis, pt placed on 10d of oral Cipro/Flagyl and symptoms resolved. Pt states abdominal pain recurred last Wednesday after a trip to [place] with worsening constant infraumbilical pain, sometimes stabbing in nature and associated with chills, so pt had repeat CT scan as outpt today demonstrated severe perforated diverticulitis with small 1.5 cm abscess, and presented to ED for further evaluation. Pt denies fevers, tolerating diet with reduced appetite, denies N/V/chest pain/dyspnea, passing flatus, last BM 2 days ago (usually daily BM), voiding w/increased frequency. Pt denies personal or family hx of IBD. Denies ever having EGD, last colonoscopy over 10 yrs ago per pt for screening, per pt results were normal.     PMH: Denies  PSH: Lap cholecystectomy (5yrs ago), Panniculectomy 4 yrs ago  Allergies: Penicillin-hives  Meds: denies  Social: smokes 2 cigarettes [id number] yrs, drinks 2 alcoholic beverages daily, denies illicit drug use,   Fam hx: father with brain cancer (26 Aug 2019 20:18)      PAST MEDICAL & SURGICAL HISTORY:  No pertinent past medical history  S/P panniculectomy: 4 yrs ago  S/P laparoscopic cholecystectomy        REVIEW OF SYSTEMS:    General:	 no weakness; no fevers, no chills  Skin/Breast: no rash  Respiratory and Thorax: no SOB, no cough  Cardiovascular:	No chest pain  Gastrointestinal:	 no nausea, vomiting , diarrhea  Genitourinary:	no dysuria, no difficulty urinating, no hematuria  Musculoskeletal:	no weakness, no joint swelling/pain  Neurological:	no focal weakness/numbness  Endocrine:	no polyuria, no polydipsia      ANTIBIOTICS:  MEDICATIONS  (STANDING):  benzylpenicilloyl/ polylysine Injectable 0.25 milliLiter(s) IntraDermal once  enoxaparin Injectable 40 milliGRAM(s) SubCutaneous every 24 hours  histamine 1 mG/mL for Percutaneous Testing 0.5 milliLiter(s) Topical once  ketorolac   Injectable 15 milliGRAM(s) IV Push every 6 hours  meropenem  IVPB 1000 milliGRAM(s) IV Intermittent every 8 hours  penicillin G potassium Skin Test 1 milliLiter(s) IntraDermal once  scopolamine   Patch 1 Patch Transdermal every 72 hours  sodium chloride 0.9% Injectable 0.5 milliLiter(s) IntraDermal once  traZODone 400 milliGRAM(s) Oral at bedtime    MEDICATIONS  (PRN):  HYDROmorphone  Injectable 0.5 milliGRAM(s) IV Push every 4 hours PRN Severe Pain (7 - 10)  HYDROmorphone  Injectable 0.3 milliGRAM(s) IV Push every 4 hours PRN Moderate Pain (4 - 6)  ondansetron Injectable 8 milliGRAM(s) IV Push every 6 hours PRN Nausea and/or Vomiting      Allergies    penicillin (Angioedema)    Intolerances        SOCIAL HISTORY:    FAMILY HISTORY:  FH: brain cancer: father      Vital Signs Last 24 Hrs  T(C): 37.2 (29 Aug 2019 21:21), Max: 37.6 (29 Aug 2019 13:37)  T(F): 98.9 (29 Aug 2019 21:21), Max: 99.6 (29 Aug 2019 13:37)  HR: 55 (29 Aug 2019 21:21) (51 - 58)  BP: 152/79 (29 Aug 2019 21:21) (113/80 - 152/79)  BP(mean): --  RR: 17 (29 Aug 2019 21:21) (17 - 18)  SpO2: 95% (29 Aug 2019 21:21) (94% - 98%)    08-28-19 @ 07:01  -  08-29-19 @ 07:00  --------------------------------------------------------  IN: 0 mL / OUT: 600 mL / NET: -600 mL    08-29-19 @ 07:01  -  08-29-19 @ 22:06  --------------------------------------------------------  IN: 1380 mL / OUT: 1350 mL / NET: 30 mL        PHYSICAL EXAM:  Constitutional:Well-developed, well nourished  Eyes:ADITI, EOMI  Ear/Nose/Throat: no oral lesion, no sinus tenderness on percussion	  Neck:no JVD, no lymphadenopathy, supple  Respiratory: CTA meeta  Cardiovascular: S1S2 RRR, no murmurs  Gastrointestinal:soft, (+) BS, no HSM  Extremities:no e/e/c  Vascular: DP Pulse:	right normal; left normal            LABS:                        10.9   10.86 )-----------( 147      ( 29 Aug 2019 07:04 )             32.4     08-29    141  |  107  |  11  ----------------------------<  88  4.1   |  20<L>  |  0.74    Ca    8.5      29 Aug 2019 07:04  Phos  3.5     08-29  Mg     1.8     08-29            MICROBIOLOGY:  RADIOLOGY & ADDITIONAL STUDIES:

## 2019-08-29 NOTE — PROGRESS NOTE ADULT - SUBJECTIVE AND OBJECTIVE BOX
SUBJECTIVE: Patient seen and examined bedside by chief resident. Has been doing much better today compared to yesterday; no further episodes of emesis, nausea has decreased in intensity and is better controlled. Was advanced to CLD yesterday evening which she tolerated well. Failed her penicillin allergy skin test yesterday as well and so was switched from IV Ceftriaxone/Flagyl to Meropenem; she states that she feels better since starting this new regimen. Has been passing flatus, no BM, voiding well. Has been OOB to use the restroom. Also mentions that she slept much better last night.     Vital Signs Last 24 Hrs  T(C): 36.6 (29 Aug 2019 05:55), Max: 37.3 (28 Aug 2019 17:11)  T(F): 97.9 (29 Aug 2019 05:55), Max: 99.2 (28 Aug 2019 17:11)  HR: 56 (29 Aug 2019 05:55) (51 - 60)  BP: 113/80 (29 Aug 2019 05:55) (113/80 - 145/79)  BP(mean): --  RR: 17 (29 Aug 2019 05:55) (16 - 17)  SpO2: 98% (29 Aug 2019 05:55) (95% - 98%)    General: NAD, resting comfortably in bed  C/V: NSR  Pulm: Nonlabored breathing, no respiratory distress  Abd: soft, ND, LLQ TTP however exam improving  Extrem: WWP, no edema, SCDs in place    I&O's Detail    28 Aug 2019 07:01  -  29 Aug 2019 07:00  --------------------------------------------------------  IN:  Total IN: 0 mL    OUT:    Voided: 600 mL  Total OUT: 600 mL    Total NET: -600 mL    08-29    141  |  107  |  11  ----------------------------<  88  4.1   |  20<L>  |  0.74    Ca    8.5      29 Aug 2019 07:04  Phos  3.5     08-29  Mg     1.8     08-29                            10.9   10.86 )-----------( 147      ( 29 Aug 2019 07:04 )             32.4

## 2019-08-29 NOTE — DIETITIAN INITIAL EVALUATION ADULT. - ADD RECOMMEND
1. Advance diet as tolerated 2. Manage pain prn 3. Monitor and replete lytes prn 4. Encourage intake

## 2019-08-29 NOTE — CONSULT NOTE ADULT - ASSESSMENT
59 F no sig PMH, PSH lap ramon (5yrs ago), hx of mild diverticulitis 4 wks ago (outpt tx Cipro/Flagyl) presenting w/ infraumbilical pain associated with subjective fever/chills for 6 days.    Recommendations  - d/t PCN allergy flagyl is best option for anaerobe coverage  - recommend restarting flagyl at alternate dose of 250mg PO q6hrs to reduce nausea side effects  - clinda is only other anaearobe coverage antibiotic however it is less effective for GI anaerobes  - start cefpodoxime 200mg PO q12 for gram negative coverage  - please d/c meropenem  - ID team 1 will continue follow    discussed with Dr. Hodges 59 F no sig PMH, PSH lap ramon (5yrs ago), hx of mild diverticulitis 4 wks ago (outpt tx Cipro/Flagyl) presenting w/ infraumbilical pain associated with subjective fever/chills for 6 days.    Recommendations  - d/t PCN allergy flagyl is best option for anaerobe coverage  - recommend restarting flagyl at alternate dose of 250mg PO q6hrs to reduce nausea side effects - would give standing anti-emetic  - clinda is only other anaearobe coverage antibiotic however it is less effective for GI anaerobes and has higher risk for development of C diff, which could be very problematic in her.  - start cefpodoxime 200mg PO q12 for gram negative coverage  - please d/c meropenem  Please recall if further ID input is desired - ID Team 1.    discussed with Dr. Hodges

## 2019-08-29 NOTE — DIETITIAN INITIAL EVALUATION ADULT. - ENERGY NEEDS
Ideal body weight used for calculations as pt >120% of IBW.   ABW 74.8kg, IBW 47kg, 159% IBW, ht 61", BMI 31.2   Nutrient needs based on [facility] standards of care for maintenance in adults, adjusted for infection

## 2019-08-29 NOTE — DIETITIAN INITIAL EVALUATION ADULT. - OTHER INFO
59F no sig PMH, PSH lap ramon (5yrs ago), hx of mild diverticulitis 4 wks ago (outpt tx Cipro/Flagylx 10d) presenting w/ presumed perforated sigmoid diverticulitis & abscess (based on OSH CT read) but now understood to be nonperforated, without abscess. On CLD at this time toelrating well per pt, still no BM but passing flatus, in pleasant mood, able to ambulate. Denies n/v/d/c, chewing/ swallowing issues or pain impacting intake, skin is intact. Denies recent wt loss. NKFA. Eager for diet advancement, discussed likely stages of advancement with pt. Plan to continue abx regimen at this time. Will follow per protocol.

## 2019-08-29 NOTE — PROGRESS NOTE ADULT - ASSESSMENT
59F no sig PMH, PSH lap ramon (5yrs ago), hx of mild diverticulitis 4 wks ago (outpt tx Cipro/[place]vn[place]00y) presenting w/ presumed perforated sigmoid diverticulitis & abscess (based on OSH CT read) but now understood to be nonperforated, without abscess.    IV Meropenem 1g q8  Will consult ID for oral antibiotic recs; penicillin allergy skin test was floridly positive  CLD  Serial Abdominal Exams  Pain/Nausea control  Lovenox/SCDs/IS/OOBA  f/u AM Labs

## 2019-08-30 ENCOUNTER — TRANSCRIPTION ENCOUNTER (OUTPATIENT)
Age: 59
End: 2019-08-30

## 2019-08-30 VITALS
OXYGEN SATURATION: 94 % | TEMPERATURE: 98 F | SYSTOLIC BLOOD PRESSURE: 136 MMHG | RESPIRATION RATE: 18 BRPM | HEART RATE: 57 BPM | DIASTOLIC BLOOD PRESSURE: 85 MMHG

## 2019-08-30 LAB
ANION GAP SERPL CALC-SCNC: 9 MMOL/L — SIGNIFICANT CHANGE UP (ref 5–17)
BUN SERPL-MCNC: 9 MG/DL — SIGNIFICANT CHANGE UP (ref 7–23)
CALCIUM SERPL-MCNC: 8.4 MG/DL — SIGNIFICANT CHANGE UP (ref 8.4–10.5)
CHLORIDE SERPL-SCNC: 109 MMOL/L — HIGH (ref 96–108)
CO2 SERPL-SCNC: 25 MMOL/L — SIGNIFICANT CHANGE UP (ref 22–31)
CREAT SERPL-MCNC: 0.89 MG/DL — SIGNIFICANT CHANGE UP (ref 0.5–1.3)
GLUCOSE SERPL-MCNC: 101 MG/DL — HIGH (ref 70–99)
HCT VFR BLD CALC: 35.3 % — SIGNIFICANT CHANGE UP (ref 34.5–45)
HGB BLD-MCNC: 11.4 G/DL — LOW (ref 11.5–15.5)
MAGNESIUM SERPL-MCNC: 1.8 MG/DL — SIGNIFICANT CHANGE UP (ref 1.6–2.6)
MCHC RBC-ENTMCNC: 31.8 PG — SIGNIFICANT CHANGE UP (ref 27–34)
MCHC RBC-ENTMCNC: 32.3 GM/DL — SIGNIFICANT CHANGE UP (ref 32–36)
MCV RBC AUTO: 98.6 FL — SIGNIFICANT CHANGE UP (ref 80–100)
NRBC # BLD: 0 /100 WBCS — SIGNIFICANT CHANGE UP (ref 0–0)
PHOSPHATE SERPL-MCNC: 3.9 MG/DL — SIGNIFICANT CHANGE UP (ref 2.5–4.5)
PLATELET # BLD AUTO: 146 K/UL — LOW (ref 150–400)
POTASSIUM SERPL-MCNC: 4 MMOL/L — SIGNIFICANT CHANGE UP (ref 3.5–5.3)
POTASSIUM SERPL-SCNC: 4 MMOL/L — SIGNIFICANT CHANGE UP (ref 3.5–5.3)
RBC # BLD: 3.58 M/UL — LOW (ref 3.8–5.2)
RBC # FLD: 12.6 % — SIGNIFICANT CHANGE UP (ref 10.3–14.5)
SODIUM SERPL-SCNC: 143 MMOL/L — SIGNIFICANT CHANGE UP (ref 135–145)
WBC # BLD: 9.84 K/UL — SIGNIFICANT CHANGE UP (ref 3.8–10.5)
WBC # FLD AUTO: 9.84 K/UL — SIGNIFICANT CHANGE UP (ref 3.8–10.5)

## 2019-08-30 PROCEDURE — 81001 URINALYSIS AUTO W/SCOPE: CPT

## 2019-08-30 PROCEDURE — 86803 HEPATITIS C AB TEST: CPT

## 2019-08-30 PROCEDURE — 99285 EMERGENCY DEPT VISIT HI MDM: CPT | Mod: 25

## 2019-08-30 PROCEDURE — 84702 CHORIONIC GONADOTROPIN TEST: CPT

## 2019-08-30 PROCEDURE — 96376 TX/PRO/DX INJ SAME DRUG ADON: CPT

## 2019-08-30 PROCEDURE — 86900 BLOOD TYPING SEROLOGIC ABO: CPT

## 2019-08-30 PROCEDURE — 93005 ELECTROCARDIOGRAM TRACING: CPT

## 2019-08-30 PROCEDURE — 71046 X-RAY EXAM CHEST 2 VIEWS: CPT

## 2019-08-30 PROCEDURE — 36415 COLL VENOUS BLD VENIPUNCTURE: CPT

## 2019-08-30 PROCEDURE — 83690 ASSAY OF LIPASE: CPT

## 2019-08-30 PROCEDURE — 96375 TX/PRO/DX INJ NEW DRUG ADDON: CPT

## 2019-08-30 PROCEDURE — 85027 COMPLETE CBC AUTOMATED: CPT

## 2019-08-30 PROCEDURE — 84100 ASSAY OF PHOSPHORUS: CPT

## 2019-08-30 PROCEDURE — 86850 RBC ANTIBODY SCREEN: CPT

## 2019-08-30 PROCEDURE — 85610 PROTHROMBIN TIME: CPT

## 2019-08-30 PROCEDURE — 96374 THER/PROPH/DIAG INJ IV PUSH: CPT

## 2019-08-30 PROCEDURE — 85730 THROMBOPLASTIN TIME PARTIAL: CPT

## 2019-08-30 PROCEDURE — 86901 BLOOD TYPING SEROLOGIC RH(D): CPT

## 2019-08-30 PROCEDURE — 80048 BASIC METABOLIC PNL TOTAL CA: CPT

## 2019-08-30 PROCEDURE — 85025 COMPLETE CBC W/AUTO DIFF WBC: CPT

## 2019-08-30 PROCEDURE — 83735 ASSAY OF MAGNESIUM: CPT

## 2019-08-30 PROCEDURE — 80053 COMPREHEN METABOLIC PANEL: CPT

## 2019-08-30 PROCEDURE — 74019 RADEX ABDOMEN 2 VIEWS: CPT

## 2019-08-30 RX ORDER — ONDANSETRON 8 MG/1
1 TABLET, FILM COATED ORAL
Qty: 60 | Refills: 0
Start: 2019-08-30 | End: 2019-09-13

## 2019-08-30 RX ORDER — CEFPODOXIME PROXETIL 100 MG
1 TABLET ORAL
Qty: 12 | Refills: 0
Start: 2019-08-30 | End: 2019-09-04

## 2019-08-30 RX ORDER — ONDANSETRON 8 MG/1
4 TABLET, FILM COATED ORAL EVERY 6 HOURS
Refills: 0 | Status: DISCONTINUED | OUTPATIENT
Start: 2019-08-30 | End: 2019-08-30

## 2019-08-30 RX ORDER — METRONIDAZOLE 500 MG
1 TABLET ORAL
Qty: 24 | Refills: 0
Start: 2019-08-30 | End: 2019-09-04

## 2019-08-30 RX ORDER — CEFPODOXIME PROXETIL 100 MG
200 TABLET ORAL EVERY 12 HOURS
Refills: 0 | Status: DISCONTINUED | OUTPATIENT
Start: 2019-08-30 | End: 2019-08-30

## 2019-08-30 RX ORDER — ONDANSETRON 8 MG/1
8 TABLET, FILM COATED ORAL EVERY 6 HOURS
Refills: 0 | Status: DISCONTINUED | OUTPATIENT
Start: 2019-08-30 | End: 2019-08-30

## 2019-08-30 RX ORDER — METRONIDAZOLE 500 MG
250 TABLET ORAL EVERY 6 HOURS
Refills: 0 | Status: DISCONTINUED | OUTPATIENT
Start: 2019-08-30 | End: 2019-08-30

## 2019-08-30 RX ADMIN — Medication 200 MILLIGRAM(S): at 17:58

## 2019-08-30 RX ADMIN — Medication 15 MILLIGRAM(S): at 12:33

## 2019-08-30 RX ADMIN — Medication 250 MILLIGRAM(S): at 17:58

## 2019-08-30 RX ADMIN — Medication 15 MILLIGRAM(S): at 05:11

## 2019-08-30 RX ADMIN — Medication 15 MILLIGRAM(S): at 12:48

## 2019-08-30 RX ADMIN — Medication 15 MILLIGRAM(S): at 05:40

## 2019-08-30 RX ADMIN — ONDANSETRON 4 MILLIGRAM(S): 8 TABLET, FILM COATED ORAL at 14:35

## 2019-08-30 RX ADMIN — Medication 15 MILLIGRAM(S): at 07:57

## 2019-08-30 RX ADMIN — Medication 15 MILLIGRAM(S): at 00:06

## 2019-08-30 RX ADMIN — Medication 250 MILLIGRAM(S): at 12:33

## 2019-08-30 RX ADMIN — MEROPENEM 100 MILLIGRAM(S): 1 INJECTION INTRAVENOUS at 05:11

## 2019-08-30 RX ADMIN — SCOPALAMINE 1 PATCH: 1 PATCH, EXTENDED RELEASE TRANSDERMAL at 07:56

## 2019-08-30 NOTE — DISCHARGE NOTE PROVIDER - CARE PROVIDER_API CALL
Asif Escalera)  ColonRectal Surgery  [address], [address]  [place] [address]  Phone: [phone]  Fax: [phone]  Follow Up Time:

## 2019-08-30 NOTE — PROGRESS NOTE ADULT - SUBJECTIVE AND OBJECTIVE BOX
Feels better. Tolerated small amount of LRD last night.  AFVSS  Abd soft, ND, mild TTP LLQ.  WBC normal    A/P: Resolving complicated diverticulitis.  1. LRD. If tolerates well, d/c home.  2. Cefpodoxime, flagyl per ID recommendations.  3. f/u with Dr. Escalera.

## 2019-08-30 NOTE — DISCHARGE NOTE PROVIDER - HOSPITAL COURSE
Pt was admitted 8/26 after having CT done at OSH for abdominal pain, which was read by outside radiologist as perforated diverticulitis w/ a 1.3cm abscess. She was started on Ceftriaxone/Flagyl and admitted to our service. Pt was admitted 8/26 after having CT done at OSH for abdominal pain, which was read by outside radiologist as perforated diverticulitis w/ a 1.3cm abscess. She was started on Ceftriaxone/Flagyl and admitted to our service. Our radiologists reinterpreted the images which they believe was not perforated and did not show an abscess. She had several episodes of vomiting which were managed with a regimen of zofran reglan & scopolamine patches. Medicine procedure team was consulted for penicillin allergy skin testing given her past history of penicillin reaction. She was floridly positive. She was switched to IV meropenem and ID was consulted to determine an ideal outpt antibiotic regimen. Dr. Hodges recommended combo PO cefpodoxime & low dose PO flagyl at a more frequent interval. She was transitioned to PO antibiotics while here in addition to being advanced to a regular diet & PO antiemetics. On day of discharge she was tolerating her antimicrobial therapy, had her nausea under control and was consuming a regular diet. She was stable at time of discharge.

## 2019-08-30 NOTE — PROGRESS NOTE ADULT - REASON FOR ADMISSION
Perforated Diverticulitis

## 2019-08-30 NOTE — DISCHARGE NOTE PROVIDER - NSDCFUADDINST_GEN_ALL_CORE_FT
You have been prescribed oral antibiotics. Please be sure to complete the entire course as directed on insert.

## 2019-08-30 NOTE — DISCHARGE NOTE PROVIDER - NSDCCPCAREPLAN_GEN_ALL_CORE_FT
PRINCIPAL DISCHARGE DIAGNOSIS  Diagnosis: Diverticulitis  Assessment and Plan of Treatment: General Discharge Instructions:  Please resume all regular home medications unless specifically advised not to take a particular medication. Also, please take any new medications as prescribed.  Please get plenty of rest, continue to ambulate several times per day, and drink adequate amounts of fluids. Avoid lifting weights greater than 5-10 lbs until you follow-up with your surgeon, who will instruct you further regarding activity restrictions.  Avoid driving or operating heavy machinery while taking pain medications.  Please follow-up with your surgeon and Primary Care Provider (PCP) as advised.  Incision Care:  *Please call your doctor or nurse practitioner if you have increased pain, swelling, redness, or drainage from the incision site.  *Avoid swimming and baths until your follow-up appointment.  *You may shower, and wash surgical incisions with a mild soap and warm water. Gently pat the area dry.  *If you have staples, they will be removed at your follow-up appointment.  *If you have steri-strips, they will fall off on their own. Please remove any remaining strips 7-10 days after surgery.  3

## 2019-08-30 NOTE — PROGRESS NOTE ADULT - ASSESSMENT
59F no sig PMH, PSH lap ramon (5yrs ago), hx of mild diverticulitis 4 wks ago (outpt tx Cipro/Hleupqt96l) presenting w/ presumed perforated sigmoid diverticulitis & abscess (based on OSH CT read) but now understood to be nonperforated, without abscess.    PO Flagyl 250mg q 6 & Cefpodoxime 200mg q12  LRD  Pain/Nausea control  f/u AM Labs  d/c likely today

## 2019-08-30 NOTE — DISCHARGE NOTE NURSING/CASE MANAGEMENT/SOCIAL WORK - PATIENT PORTAL LINK FT
You can access the FollowMyHealth Patient Portal offered by [facility] by registering at the following website: [email or web address]. By joining [employer]’s FollowMyHealth portal, you will also be able to view your health information using other applications (apps) compatible with our system.

## 2019-08-30 NOTE — PROGRESS NOTE ADULT - SUBJECTIVE AND OBJECTIVE BOX
SUBJECTIVE: Patient seen and examined bedside by chief resident. Status is improved this AM, tolerating her LRD, no emesis, nausea c/w baseline. Pain is under control, improved today. Completed her last dose of Meropenem this AM, and is aware that she will be transitioning to PO abx in anticipation of d/c later today.      Vital Signs Last 24 Hrs  T(C): 36.7 (30 Aug 2019 05:27), Max: 37.6 (29 Aug 2019 13:37)  T(F): 98.1 (30 Aug 2019 05:27), Max: 99.6 (29 Aug 2019 13:37)  HR: 57 (30 Aug 2019 05:27) (51 - 58)  BP: 145/76 (30 Aug 2019 05:27) (129/79 - 152/79)  BP(mean): --  RR: 16 (30 Aug 2019 05:27) (16 - 18)  SpO2: 94% (30 Aug 2019 05:27) (94% - 97%)    General: NAD, resting comfortably in bed  C/V: NSR  Pulm: Nonlabored breathing, no respiratory distress  Abd: soft, ND, LLQ TTP however exam improving  Extrem: WWP, no edema, SCDs in place SUBJECTIVE: Patient seen and examined bedside by chief resident. Status is improved this AM, tolerating her LRD, no emesis, nausea c/w baseline. Pain is under control, improved today. Completed her last dose of Meropenem this AM, and is aware that she will be transitioning to PO abx in anticipation of d/c later today.      Vital Signs Last 24 Hrs  T(C): 36.7 (30 Aug 2019 05:27), Max: 37.6 (29 Aug 2019 13:37)  T(F): 98.1 (30 Aug 2019 05:27), Max: 99.6 (29 Aug 2019 13:37)  HR: 57 (30 Aug 2019 05:27) (51 - 58)  BP: 145/76 (30 Aug 2019 05:27) (129/79 - 152/79)  BP(mean): --  RR: 16 (30 Aug 2019 05:27) (16 - 18)  SpO2: 94% (30 Aug 2019 05:27) (94% - 97%)    General: NAD, resting comfortably in bed  C/V: NSR  Pulm: Nonlabored breathing, no respiratory distress  Abd: soft, ND, LLQ TTP however exam improving  Extrem: WWP, no edema, SCDs in place    I&O's Detail    29 Aug 2019 07:01  -  30 Aug 2019 07:00  --------------------------------------------------------  IN:    IV PiggyBack: 100 mL    lactated ringers.: 900 mL    Oral Fluid: 480 mL  Total IN: 1480 mL    OUT:    Voided: 1800 mL  Total OUT: 1800 mL    Total NET: -320 mL      08-30    143  |  109<H>  |  9   ----------------------------<  101<H>  4.0   |  25  |  0.89    Ca    8.4      30 Aug 2019 07:05  Phos  3.9     08-30  Mg     1.8     08-30                              11.4   9.84  )-----------( 146      ( 30 Aug 2019 07:05 )             35.3

## 2019-08-30 NOTE — DISCHARGE NOTE PROVIDER - NSDCFUADDAPPT_GEN_ALL_CORE_FT
Please follow up with Dr. Escalera in 1 week. Call the office at [phone] to confirm your appointment.

## 2019-08-30 NOTE — DISCHARGE NOTE NURSING/CASE MANAGEMENT/SOCIAL WORK - NSDCFUADDAPPT_GEN_ALL_CORE_FT
Please follow up with Dr. Escalera in 1 week. Call the office at [phone] to confirm your appointment.

## 2019-09-03 DIAGNOSIS — Z90.49 ACQUIRED ABSENCE OF OTHER SPECIFIED PARTS OF DIGESTIVE TRACT: ICD-10-CM

## 2019-09-03 DIAGNOSIS — K57.20 DIVERTICULITIS OF LARGE INTESTINE WITH PERFORATION AND ABSCESS WITHOUT BLEEDING: ICD-10-CM

## 2019-10-04 ENCOUNTER — INPATIENT (INPATIENT)
Facility: HOSPITAL | Age: 59
LOS: 2 days | Discharge: HOME | DRG: 392 | End: 2019-10-07
Attending: COLON & RECTAL SURGERY | Admitting: COLON & RECTAL SURGERY
Payer: COMMERCIAL

## 2019-10-04 VITALS
TEMPERATURE: 98 F | SYSTOLIC BLOOD PRESSURE: 126 MMHG | HEART RATE: 89 BPM | OXYGEN SATURATION: 97 % | HEIGHT: 62 IN | WEIGHT: 160.06 LBS | DIASTOLIC BLOOD PRESSURE: 88 MMHG | RESPIRATION RATE: 16 BRPM

## 2019-10-04 DIAGNOSIS — Z98.890 OTHER SPECIFIED POSTPROCEDURAL STATES: Chronic | ICD-10-CM

## 2019-10-04 DIAGNOSIS — Z90.49 ACQUIRED ABSENCE OF OTHER SPECIFIED PARTS OF DIGESTIVE TRACT: Chronic | ICD-10-CM

## 2019-10-04 LAB
ALBUMIN SERPL ELPH-MCNC: 4.3 G/DL — SIGNIFICANT CHANGE UP (ref 3.3–5)
ALP SERPL-CCNC: 76 U/L — SIGNIFICANT CHANGE UP (ref 40–120)
ALT FLD-CCNC: 29 U/L — SIGNIFICANT CHANGE UP (ref 10–45)
ANION GAP SERPL CALC-SCNC: 12 MMOL/L — SIGNIFICANT CHANGE UP (ref 5–17)
APPEARANCE UR: CLEAR — SIGNIFICANT CHANGE UP
APTT BLD: 31.3 SEC — SIGNIFICANT CHANGE UP (ref 27.5–36.3)
AST SERPL-CCNC: 24 U/L — SIGNIFICANT CHANGE UP (ref 10–40)
BASOPHILS # BLD AUTO: 0.04 K/UL — SIGNIFICANT CHANGE UP (ref 0–0.2)
BASOPHILS NFR BLD AUTO: 0.3 % — SIGNIFICANT CHANGE UP (ref 0–2)
BILIRUB SERPL-MCNC: 0.9 MG/DL — SIGNIFICANT CHANGE UP (ref 0.2–1.2)
BILIRUB UR-MCNC: NEGATIVE — SIGNIFICANT CHANGE UP
BUN SERPL-MCNC: 16 MG/DL — SIGNIFICANT CHANGE UP (ref 7–23)
CALCIUM SERPL-MCNC: 9.6 MG/DL — SIGNIFICANT CHANGE UP (ref 8.4–10.5)
CHLORIDE SERPL-SCNC: 102 MMOL/L — SIGNIFICANT CHANGE UP (ref 96–108)
CO2 SERPL-SCNC: 26 MMOL/L — SIGNIFICANT CHANGE UP (ref 22–31)
COLOR SPEC: YELLOW — SIGNIFICANT CHANGE UP
CREAT SERPL-MCNC: 0.85 MG/DL — SIGNIFICANT CHANGE UP (ref 0.5–1.3)
DIFF PNL FLD: ABNORMAL
EOSINOPHIL # BLD AUTO: 0.03 K/UL — SIGNIFICANT CHANGE UP (ref 0–0.5)
EOSINOPHIL NFR BLD AUTO: 0.2 % — SIGNIFICANT CHANGE UP (ref 0–6)
GLUCOSE SERPL-MCNC: 111 MG/DL — HIGH (ref 70–99)
GLUCOSE UR QL: NEGATIVE — SIGNIFICANT CHANGE UP
HCT VFR BLD CALC: 39.1 % — SIGNIFICANT CHANGE UP (ref 34.5–45)
HGB BLD-MCNC: 12.9 G/DL — SIGNIFICANT CHANGE UP (ref 11.5–15.5)
IMM GRANULOCYTES NFR BLD AUTO: 0.4 % — SIGNIFICANT CHANGE UP (ref 0–1.5)
INR BLD: 1.02 — SIGNIFICANT CHANGE UP (ref 0.88–1.16)
KETONES UR-MCNC: NEGATIVE — SIGNIFICANT CHANGE UP
LEUKOCYTE ESTERASE UR-ACNC: NEGATIVE — SIGNIFICANT CHANGE UP
LIDOCAIN IGE QN: 12 U/L — SIGNIFICANT CHANGE UP (ref 7–60)
LYMPHOCYTES # BLD AUTO: 17 % — SIGNIFICANT CHANGE UP (ref 13–44)
LYMPHOCYTES # BLD AUTO: 2.15 K/UL — SIGNIFICANT CHANGE UP (ref 1–3.3)
MCHC RBC-ENTMCNC: 32 PG — SIGNIFICANT CHANGE UP (ref 27–34)
MCHC RBC-ENTMCNC: 33 GM/DL — SIGNIFICANT CHANGE UP (ref 32–36)
MCV RBC AUTO: 97 FL — SIGNIFICANT CHANGE UP (ref 80–100)
MONOCYTES # BLD AUTO: 1.22 K/UL — HIGH (ref 0–0.9)
MONOCYTES NFR BLD AUTO: 9.7 % — SIGNIFICANT CHANGE UP (ref 2–14)
NEUTROPHILS # BLD AUTO: 9.13 K/UL — HIGH (ref 1.8–7.4)
NEUTROPHILS NFR BLD AUTO: 72.4 % — SIGNIFICANT CHANGE UP (ref 43–77)
NITRITE UR-MCNC: NEGATIVE — SIGNIFICANT CHANGE UP
NRBC # BLD: 0 /100 WBCS — SIGNIFICANT CHANGE UP (ref 0–0)
PH UR: 6 — SIGNIFICANT CHANGE UP (ref 5–8)
PLATELET # BLD AUTO: 128 K/UL — LOW (ref 150–400)
POTASSIUM SERPL-MCNC: 4.4 MMOL/L — SIGNIFICANT CHANGE UP (ref 3.5–5.3)
POTASSIUM SERPL-SCNC: 4.4 MMOL/L — SIGNIFICANT CHANGE UP (ref 3.5–5.3)
PROT SERPL-MCNC: 7.7 G/DL — SIGNIFICANT CHANGE UP (ref 6–8.3)
PROT UR-MCNC: NEGATIVE MG/DL — SIGNIFICANT CHANGE UP
PROTHROM AB SERPL-ACNC: 11.5 SEC — SIGNIFICANT CHANGE UP (ref 10–12.9)
RBC # BLD: 4.03 M/UL — SIGNIFICANT CHANGE UP (ref 3.8–5.2)
RBC # FLD: 12.8 % — SIGNIFICANT CHANGE UP (ref 10.3–14.5)
SODIUM SERPL-SCNC: 140 MMOL/L — SIGNIFICANT CHANGE UP (ref 135–145)
SP GR SPEC: 1.01 — SIGNIFICANT CHANGE UP (ref 1–1.03)
UROBILINOGEN FLD QL: 0.2 E.U./DL — SIGNIFICANT CHANGE UP
WBC # BLD: 12.62 K/UL — HIGH (ref 3.8–10.5)
WBC # FLD AUTO: 12.62 K/UL — HIGH (ref 3.8–10.5)

## 2019-10-04 PROCEDURE — 99285 EMERGENCY DEPT VISIT HI MDM: CPT

## 2019-10-04 PROCEDURE — 93010 ELECTROCARDIOGRAM REPORT: CPT | Mod: NC

## 2019-10-04 PROCEDURE — 74177 CT ABD & PELVIS W/CONTRAST: CPT | Mod: 26

## 2019-10-04 RX ORDER — METRONIDAZOLE 500 MG
500 TABLET ORAL ONCE
Refills: 0 | Status: COMPLETED | OUTPATIENT
Start: 2019-10-04 | End: 2019-10-04

## 2019-10-04 RX ORDER — ACETAMINOPHEN 500 MG
1000 TABLET ORAL ONCE
Refills: 0 | Status: COMPLETED | OUTPATIENT
Start: 2019-10-04 | End: 2019-10-04

## 2019-10-04 RX ORDER — ONDANSETRON 8 MG/1
4 TABLET, FILM COATED ORAL ONCE
Refills: 0 | Status: COMPLETED | OUTPATIENT
Start: 2019-10-04 | End: 2019-10-04

## 2019-10-04 RX ORDER — CEFTRIAXONE 500 MG/1
1000 INJECTION, POWDER, FOR SOLUTION INTRAMUSCULAR; INTRAVENOUS ONCE
Refills: 0 | Status: COMPLETED | OUTPATIENT
Start: 2019-10-04 | End: 2019-10-04

## 2019-10-04 RX ORDER — MEROPENEM 1 G/30ML
1000 INJECTION INTRAVENOUS EVERY 8 HOURS
Refills: 0 | Status: DISCONTINUED | OUTPATIENT
Start: 2019-10-04 | End: 2019-10-07

## 2019-10-04 RX ORDER — SODIUM CHLORIDE 9 MG/ML
1000 INJECTION INTRAMUSCULAR; INTRAVENOUS; SUBCUTANEOUS
Refills: 0 | Status: DISCONTINUED | OUTPATIENT
Start: 2019-10-04 | End: 2019-10-07

## 2019-10-04 RX ORDER — MORPHINE SULFATE 50 MG/1
4 CAPSULE, EXTENDED RELEASE ORAL ONCE
Refills: 0 | Status: DISCONTINUED | OUTPATIENT
Start: 2019-10-04 | End: 2019-10-04

## 2019-10-04 RX ORDER — ONDANSETRON 8 MG/1
4 TABLET, FILM COATED ORAL EVERY 6 HOURS
Refills: 0 | Status: DISCONTINUED | OUTPATIENT
Start: 2019-10-04 | End: 2019-10-07

## 2019-10-04 RX ORDER — SODIUM CHLORIDE 9 MG/ML
1000 INJECTION INTRAMUSCULAR; INTRAVENOUS; SUBCUTANEOUS ONCE
Refills: 0 | Status: COMPLETED | OUTPATIENT
Start: 2019-10-04 | End: 2019-10-04

## 2019-10-04 RX ORDER — ACETAMINOPHEN 500 MG
1000 TABLET ORAL ONCE
Refills: 0 | Status: COMPLETED | OUTPATIENT
Start: 2019-10-05 | End: 2019-10-05

## 2019-10-04 RX ORDER — IOHEXOL 300 MG/ML
30 INJECTION, SOLUTION INTRAVENOUS ONCE
Refills: 0 | Status: COMPLETED | OUTPATIENT
Start: 2019-10-04 | End: 2019-10-04

## 2019-10-04 RX ORDER — TRAZODONE HCL 50 MG
400 TABLET ORAL AT BEDTIME
Refills: 0 | Status: DISCONTINUED | OUTPATIENT
Start: 2019-10-04 | End: 2019-10-07

## 2019-10-04 RX ORDER — HEPARIN SODIUM 5000 [USP'U]/ML
5000 INJECTION INTRAVENOUS; SUBCUTANEOUS EVERY 8 HOURS
Refills: 0 | Status: DISCONTINUED | OUTPATIENT
Start: 2019-10-04 | End: 2019-10-07

## 2019-10-04 RX ORDER — INFLUENZA VIRUS VACCINE 15; 15; 15; 15 UG/.5ML; UG/.5ML; UG/.5ML; UG/.5ML
0.5 SUSPENSION INTRAMUSCULAR ONCE
Refills: 0 | Status: DISCONTINUED | OUTPATIENT
Start: 2019-10-04 | End: 2019-10-07

## 2019-10-04 RX ORDER — KETOROLAC TROMETHAMINE 30 MG/ML
30 SYRINGE (ML) INJECTION EVERY 6 HOURS
Refills: 0 | Status: COMPLETED | OUTPATIENT
Start: 2019-10-04 | End: 2019-10-07

## 2019-10-04 RX ADMIN — ONDANSETRON 104 MILLIGRAM(S): 8 TABLET, FILM COATED ORAL at 16:48

## 2019-10-04 RX ADMIN — SODIUM CHLORIDE 110 MILLILITER(S): 9 INJECTION INTRAMUSCULAR; INTRAVENOUS; SUBCUTANEOUS at 19:23

## 2019-10-04 RX ADMIN — Medication 1000 MILLIGRAM(S): at 20:36

## 2019-10-04 RX ADMIN — SODIUM CHLORIDE 1000 MILLILITER(S): 9 INJECTION INTRAMUSCULAR; INTRAVENOUS; SUBCUTANEOUS at 14:33

## 2019-10-04 RX ADMIN — Medication 100 MILLIGRAM(S): at 17:14

## 2019-10-04 RX ADMIN — MEROPENEM 100 MILLIGRAM(S): 1 INJECTION INTRAVENOUS at 21:15

## 2019-10-04 RX ADMIN — CEFTRIAXONE 100 MILLIGRAM(S): 500 INJECTION, POWDER, FOR SOLUTION INTRAMUSCULAR; INTRAVENOUS at 16:41

## 2019-10-04 RX ADMIN — MORPHINE SULFATE 4 MILLIGRAM(S): 50 CAPSULE, EXTENDED RELEASE ORAL at 17:55

## 2019-10-04 RX ADMIN — SODIUM CHLORIDE 1000 MILLILITER(S): 9 INJECTION INTRAMUSCULAR; INTRAVENOUS; SUBCUTANEOUS at 15:16

## 2019-10-04 RX ADMIN — Medication 30 MILLIGRAM(S): at 22:56

## 2019-10-04 RX ADMIN — Medication 400 MILLIGRAM(S): at 19:23

## 2019-10-04 RX ADMIN — MORPHINE SULFATE 4 MILLIGRAM(S): 50 CAPSULE, EXTENDED RELEASE ORAL at 16:45

## 2019-10-04 RX ADMIN — Medication 30 MILLIGRAM(S): at 23:23

## 2019-10-04 RX ADMIN — HEPARIN SODIUM 5000 UNIT(S): 5000 INJECTION INTRAVENOUS; SUBCUTANEOUS at 21:15

## 2019-10-04 RX ADMIN — ONDANSETRON 4 MILLIGRAM(S): 8 TABLET, FILM COATED ORAL at 14:33

## 2019-10-04 RX ADMIN — IOHEXOL 30 MILLILITER(S): 300 INJECTION, SOLUTION INTRAVENOUS at 14:33

## 2019-10-04 RX ADMIN — MORPHINE SULFATE 4 MILLIGRAM(S): 50 CAPSULE, EXTENDED RELEASE ORAL at 15:00

## 2019-10-04 RX ADMIN — MORPHINE SULFATE 4 MILLIGRAM(S): 50 CAPSULE, EXTENDED RELEASE ORAL at 14:30

## 2019-10-04 RX ADMIN — Medication 400 MILLIGRAM(S): at 21:15

## 2019-10-04 NOTE — ED PROVIDER NOTE - OBJECTIVE STATEMENT
60 y/o F with PMHx of diverticulitis with perforated abscess at end of past Aug presents to ED with worsening left lower abdominal pain and associated fever of 100. Pt states that pain feels similar to pain at prior hospitalization. Pt followed up with Dr. Colin who recommended she come into to ED given worsening of symptoms over past 2 days and associated nausea. Pt reports no vomiting. Pt reports she had planned for eventual surgery in near future but this worsening pain is alarming. Pt denies chest pain, palpitations, cough, and SOB. 58 y/o F with PMHx of diverticulitis with perforated abscess at end of past Aug presents to ED with worsening left lower abdominal pain and associated fever of 100. Pt states that pain feels similar to pain at prior hospitalization. Pt followed up with Dr. Singer who recommended she come into to ED given worsening of symptoms over past 2 days and associated nausea and fever. Pt reports no vomiting. Pt reports she had planned for eventual surgery in near future but this worsening pain is alarming. Pt denies chest pain, palpitations, cough, and SOB.

## 2019-10-04 NOTE — H&P ADULT - NSHPLABSRESULTS_GEN_ALL_CORE
Vital Signs:  Vital Signs Last 24 Hrs  T(C): 36.7 (04 Oct 2019 15:43), Max: 36.7 (04 Oct 2019 13:32)  T(F): 98 (04 Oct 2019 15:43), Max: 98.1 (04 Oct 2019 13:32)  HR: 57 (04 Oct 2019 15:43) (57 - 89)  BP: 159/85 (04 Oct 2019 15:43) (126/88 - 159/85)  BP(mean): --  RR: 16 (04 Oct 2019 15:43) (16 - 16)  SpO2: 100% (04 Oct 2019 15:43) (97% - 100%)      Labs:                        12.9   12.62 )-----------( 128      ( 04 Oct 2019 14:19 )             39.1     10-04    140  |  102  |  16  ----------------------------<  111<H>  4.4   |  26  |  0.85    Ca    9.6      04 Oct 2019 14:19    TPro  7.7  /  Alb  4.3  /  TBili  0.9  /  DBili  <0.2  /  AST  24  /  ALT  29  /  AlkPhos  76  10-04    PT/INR - ( 04 Oct 2019 14:19 )   PT: 11.5 sec;   INR: 1.02          PTT - ( 04 Oct 2019 14:19 )  PTT:31.3 sec  Urinalysis Basic - ( 04 Oct 2019 14:19 )    Color: Yellow / Appearance: Clear / S.010 / pH: x  Gluc: x / Ketone: NEGATIVE  / Bili: Negative / Urobili: 0.2 E.U./dL   Blood: x / Protein: NEGATIVE mg/dL / Nitrite: NEGATIVE   Leuk Esterase: NEGATIVE / RBC: < 5 /HPF / WBC < 5 /HPF   Sq Epi: x / Non Sq Epi: 5-10 /HPF / Bacteria: Present /HPF      CAPILLARY BLOOD GLUCOSE        LIVER FUNCTIONS - ( 04 Oct 2019 14:19 )  Alb: 4.3 g/dL / Pro: 7.7 g/dL / ALK PHOS: 76 U/L / ALT: 29 U/L / AST: 24 U/L / GGT: x    CT PO/IV CONTRAST  Intrahepatic and extrahepatic bile ductal dilatation, the etiology of   which is not seen on this examination. CBD measures 1.2 cm in diameter.   Probable subcentimeter hypervascular lesions in the right lobe of liver.   Status post cholecystectomy.  The pancreas is normal in appearance.  No   splenic abnormalities are seen.    The adrenal glands are unremarkable. The kidneys are normal in   appearance.        No abdominal aortic aneurysm is seen. No lymphadenopathy is seen.     Evaluation of the bowel demonstrates mild wall thickening and pericolonic   fat stranding of the proximal descending colon at a site of colonic   diverticuli consistent with mild diverticulitis. No diverticular abscess   or free intraperitoneal air. 1 cm periampullary duodenal diverticulum.   Normal appendix. No ascites. Small fat-containing umbilical hernia.    Images of the pelvis demonstrate the urinary bladder, uterus and left   adnexae to be normal in appearance.   1.9 cm right ovarian cyst/dominant   follicle.    Evaluation of the osseous structures demonstrates no acute abnormalities.    IMPRESSION:  Noncomplicated descending colon diverticulitis.    1 cm periampullary diverticulum.     Intrahepatic and extrahepatic bile ductal dilatation up to the ampulla,   the etiology of which is not seen on this examination. Recommend MRI with   pancreatic mass protocol when clinically appropriate.    1.9 cm right ovarian cyst/dominant follicle. If patient is premenopausal,   no follow-up is needed. If patient is postmenopausal, yearly follow-up   ultrasound is recommended.

## 2019-10-04 NOTE — ED ADULT NURSE NOTE - OBJECTIVE STATEMENT
PT is a 58 y/o 58 y/o female who came in to ED c/o LLQ abdominal pain. Pt is a Dr. Singer client. recently admitted for diverticulitis and pt has hx of multiple admissions.

## 2019-10-04 NOTE — CHART NOTE - NSCHARTNOTEFT_GEN_A_CORE
Infectious Diseases Anti-infective Approval Note    Medication:meropenem  Dose:1 gm  Route:iv  Frequency:q8h  Duration:7 days    *THIS IS NOT AN INFECTIOUS DISEASES CONSULTATION*

## 2019-10-04 NOTE — ED PROVIDER NOTE - NS_EDPROVIDERDISPOUSERTYPE_ED_A_ED
Orders entered   Scribe Attestation (For Scribes USE Only)... Attending Attestation (For Attendings USE Only).../Scribe Attestation (For Scribes USE Only)...

## 2019-10-04 NOTE — H&P ADULT - NSHPPHYSICALEXAM_GEN_ALL_CORE
Physical Exam:  General: NAD  Pulmonary: Nonlabored breathing, no respiratory distress  Abdominal: Soft, LLQ tenderness to light palpation, slightly distended  Neuro: AAO x3, no focal deficits

## 2019-10-04 NOTE — H&P ADULT - ASSESSMENT
A/P: 59F PMH recurrent diverticulitis first treated end of July with 10 days of cipro/flagyl, recent admission 8/26-8/30 with questionable perforated diverticulitis and 1.5cm abscess treated with cefpodoxime/flagyl per ID 2/2 confirmed PCN allergy, presents with diverticulitis    - Admit to regional bed under Dr. Escalera  - Pain/nausea PRN  - NPO/IVF  - Meropenem, pending ID consult  - OOBA/SCDs/SQH  - Case discussed with chief resident

## 2019-10-04 NOTE — ED PROVIDER NOTE - CLINICAL SUMMARY MEDICAL DECISION MAKING FREE TEXT BOX
60 y/o F with PMHx of diverticulitis with perforated abscess at end of past Aug presents to ED with worsening LLQ pain over past 2 days  and associated fever of 100. Pt. well, nontoxic, discomfort to palpation in LLQ. Will order Labs CR, Surgery consult. 60 y/o F with PMHx of diverticulitis with perforated abscess at end of past Aug presents to ED with worsening LLQ pain over past 2 days  and associated fever of 100. Pt. well, nontoxic, discomfort to palpation in LLQ. Will order Labs ct which shows diverticulitis. Surgery consult who recommend admission. pt agreeable to plan. 58 y/o F with PMHx of diverticulitis with perforated abscess at end of past Aug presents to ED with worsening LLQ pain over past 2 days  and associated fever of 100. Pt. well, nontoxic, discomfort to palpation in LLQ. Will order Labs ct which shows diverticulitis. pt is aware of ovarian cyst right ovary and ductal dilation of the biliary ducts- surgery states aware and plan to follow upon admission. Surgery consult who recommend admission. pt agreeable to plan.

## 2019-10-04 NOTE — ED ADULT TRIAGE NOTE - CHIEF COMPLAINT QUOTE
Patient states," I have been having pain in my stomach for a couple of days. I was here in August for the same thing, I was admitted for 5 days. Dr. Johnson sent me." Pain in LLQ, associated symptoms of fever (99.7F today), Prior tx. Advil. Denies any urinary symptoms, N/V/D.

## 2019-10-04 NOTE — ED PROVIDER NOTE - PHYSICAL EXAMINATION
General: Patient is well developed and well nourished. Patient is alert and oriented to person, place and date. Patient is laying comfortably in stretcher and appears in no acute distress.  Heart: Regular rate and rhythm. No murmurs, rubs or gallops.   Lungs: Clear to auscultation bilaterally with equal chest expansion. No note of wheezes, rhonchi, rales. Equal chest expansion. No note of retractions.  Abdomen: ttp llq. Bowel sounds present in all four quadrants. Soft, non-tender, non-distended without signs of masses, rebound or guarding. No note of hepatosplenomegaly. No CVA tenderness bilaterally. Negative Wells sign. No pain present over McBurney's point.  Neuro: GCS 15. Moving all extremities  Skin: Warm, dry and intact without evidence of rashes, bruising, pallor, jaundice or cyanosis.   Psych: Mood and affect appropriate.

## 2019-10-04 NOTE — H&P ADULT - NSHPSOCIALHISTORY_GEN_ALL_CORE
Currently not smoking tobacco, but prior 2 cigarettes/day for 20 years. Consumes 2 alcoholic drinks/day

## 2019-10-04 NOTE — H&P ADULT - NSICDXPASTMEDICALHX_GEN_ALL_CORE_FT
PAST MEDICAL HISTORY:  Diverticulitis of intestine with abscess, unspecified bleeding status, unspecified part of intestinal tract

## 2019-10-04 NOTE — H&P ADULT - HISTORY OF PRESENT ILLNESS
59F PMH recurrent diverticulitis first treated end of July with 10 days of cipro/flagyl, recent admission 8/26-8/30 with questionable perforated diverticulitis and 1.5cm abscess treated with cefpodoxime/flagyl per ID 2/2 confirmed PCN allergy, presents with 2 days of LLQ pain. The pain is described as a constant tightness, which is similar to her prior episodes of diverticulitis. She endorses anorexia with nausea during these 2 days, but no emesis. She last passed flatus and a brown bowel movement yesterday. She endorses subjective fevers and chills today, but no objective fevers. She denies any blood per rectum, sick contacts, recent travel, or EGD. Her last colonoscopy was 8 years ago and reportedly normal.

## 2019-10-05 LAB
ANION GAP SERPL CALC-SCNC: 10 MMOL/L — SIGNIFICANT CHANGE UP (ref 5–17)
BUN SERPL-MCNC: 14 MG/DL — SIGNIFICANT CHANGE UP (ref 7–23)
CALCIUM SERPL-MCNC: 8.3 MG/DL — LOW (ref 8.4–10.5)
CHLORIDE SERPL-SCNC: 106 MMOL/L — SIGNIFICANT CHANGE UP (ref 96–108)
CO2 SERPL-SCNC: 24 MMOL/L — SIGNIFICANT CHANGE UP (ref 22–31)
CREAT SERPL-MCNC: 0.87 MG/DL — SIGNIFICANT CHANGE UP (ref 0.5–1.3)
GLUCOSE SERPL-MCNC: 103 MG/DL — HIGH (ref 70–99)
HCT VFR BLD CALC: 33.5 % — LOW (ref 34.5–45)
HGB BLD-MCNC: 10.9 G/DL — LOW (ref 11.5–15.5)
MAGNESIUM SERPL-MCNC: 2.2 MG/DL — SIGNIFICANT CHANGE UP (ref 1.6–2.6)
MCHC RBC-ENTMCNC: 32.1 PG — SIGNIFICANT CHANGE UP (ref 27–34)
MCHC RBC-ENTMCNC: 32.5 GM/DL — SIGNIFICANT CHANGE UP (ref 32–36)
MCV RBC AUTO: 98.5 FL — SIGNIFICANT CHANGE UP (ref 80–100)
NRBC # BLD: 0 /100 WBCS — SIGNIFICANT CHANGE UP (ref 0–0)
PHOSPHATE SERPL-MCNC: 4.1 MG/DL — SIGNIFICANT CHANGE UP (ref 2.5–4.5)
PLATELET # BLD AUTO: 102 K/UL — LOW (ref 150–400)
POTASSIUM SERPL-MCNC: 3.9 MMOL/L — SIGNIFICANT CHANGE UP (ref 3.5–5.3)
POTASSIUM SERPL-SCNC: 3.9 MMOL/L — SIGNIFICANT CHANGE UP (ref 3.5–5.3)
RBC # BLD: 3.4 M/UL — LOW (ref 3.8–5.2)
RBC # FLD: 12.8 % — SIGNIFICANT CHANGE UP (ref 10.3–14.5)
SODIUM SERPL-SCNC: 140 MMOL/L — SIGNIFICANT CHANGE UP (ref 135–145)
WBC # BLD: 6.28 K/UL — SIGNIFICANT CHANGE UP (ref 3.8–10.5)
WBC # FLD AUTO: 6.28 K/UL — SIGNIFICANT CHANGE UP (ref 3.8–10.5)

## 2019-10-05 RX ORDER — ACETAMINOPHEN 500 MG
650 TABLET ORAL ONCE
Refills: 0 | Status: COMPLETED | OUTPATIENT
Start: 2019-10-05 | End: 2019-10-05

## 2019-10-05 RX ORDER — ACETAMINOPHEN 500 MG
1000 TABLET ORAL ONCE
Refills: 0 | Status: DISCONTINUED | OUTPATIENT
Start: 2019-10-05 | End: 2019-10-05

## 2019-10-05 RX ADMIN — Medication 30 MILLIGRAM(S): at 17:51

## 2019-10-05 RX ADMIN — MEROPENEM 100 MILLIGRAM(S): 1 INJECTION INTRAVENOUS at 14:04

## 2019-10-05 RX ADMIN — Medication 30 MILLIGRAM(S): at 12:30

## 2019-10-05 RX ADMIN — Medication 400 MILLIGRAM(S): at 07:09

## 2019-10-05 RX ADMIN — Medication 30 MILLIGRAM(S): at 17:25

## 2019-10-05 RX ADMIN — Medication 30 MILLIGRAM(S): at 05:46

## 2019-10-05 RX ADMIN — MEROPENEM 100 MILLIGRAM(S): 1 INJECTION INTRAVENOUS at 21:41

## 2019-10-05 RX ADMIN — Medication 1000 MILLIGRAM(S): at 07:21

## 2019-10-05 RX ADMIN — HEPARIN SODIUM 5000 UNIT(S): 5000 INJECTION INTRAVENOUS; SUBCUTANEOUS at 06:36

## 2019-10-05 RX ADMIN — Medication 400 MILLIGRAM(S): at 21:40

## 2019-10-05 RX ADMIN — Medication 30 MILLIGRAM(S): at 06:38

## 2019-10-05 RX ADMIN — SODIUM CHLORIDE 110 MILLILITER(S): 9 INJECTION INTRAMUSCULAR; INTRAVENOUS; SUBCUTANEOUS at 05:46

## 2019-10-05 RX ADMIN — Medication 650 MILLIGRAM(S): at 22:37

## 2019-10-05 RX ADMIN — Medication 650 MILLIGRAM(S): at 23:30

## 2019-10-05 RX ADMIN — Medication 30 MILLIGRAM(S): at 23:37

## 2019-10-05 RX ADMIN — ONDANSETRON 4 MILLIGRAM(S): 8 TABLET, FILM COATED ORAL at 15:29

## 2019-10-05 RX ADMIN — Medication 30 MILLIGRAM(S): at 11:40

## 2019-10-05 RX ADMIN — HEPARIN SODIUM 5000 UNIT(S): 5000 INJECTION INTRAVENOUS; SUBCUTANEOUS at 14:04

## 2019-10-05 RX ADMIN — MEROPENEM 100 MILLIGRAM(S): 1 INJECTION INTRAVENOUS at 06:36

## 2019-10-05 RX ADMIN — HEPARIN SODIUM 5000 UNIT(S): 5000 INJECTION INTRAVENOUS; SUBCUTANEOUS at 21:40

## 2019-10-05 NOTE — CONSULT NOTE ADULT - SUBJECTIVE AND OBJECTIVE BOX
Infectious Diseases - Attending at Dr. Singer's Request.    HPI:  59F PMH recurrent diverticulitis; first treated end of July with 10 days of cipro/flagyl, but symptoms recurred and she was admitted - with questionable perforated diverticulitis and 1.5cm abscess treated initially with meropenem (documented PCN allergy), then changed to cefpodoxime/flagyl per ID.  Total course of Rx was about 10 days.      Pt states that her LLQ pain, never really resolved, and became more severe   over the last 2 days PTA,.  In addition to the localized LLQ pain, she also describes a generalized abdominal tightness.     She has anorexia with nausea during these 2 days, but no emesis. She last passed flatus and a brown bowel movement 2 days ago. She had fever to 99.8 F.     CT: "uncomplicated" colonic divertisulitis    Because of her PCN allergy and relapse after cefpedoxime-metrozidazole, she was started on meropenem 1 gram q 8h.  She has been afebrile overnight and WBC has declined 12,600 --> 6,280.    PAST MEDICAL & SURGICAL HISTORY:  Diverticulitis of intestine with abscess, unspecified bleeding status, unspecified part of intestinal tract  S/P panniculectomy: 4 yrs ago  S/P laparoscopic cholecystectomy      PERSONAL HISTORY  Habits:  EtOH            Tobacco  2 cigarettes daily       Drugs    MEDICATIONS  (STANDING):  heparin  Injectable 5000 Unit(s) SubCutaneous every 8 hours  influenza   Vaccine 0.5 milliLiter(s) IntraMuscular once  ketorolac   Injectable 30 milliGRAM(s) IV Push every 6 hours  meropenem  IVPB 1000 milliGRAM(s) IV Intermittent every 8 hours  sodium chloride 0.9%. 1000 milliLiter(s) (110 mL/Hr) IV Continuous <Continuous>  traZODone 400 milliGRAM(s) Oral at bedtime    MEDICATIONS  (PRN):  ondansetron Injectable 4 milliGRAM(s) IV Push every 6 hours PRN Nausea    FAMILY HISTORY:  FH: brain cancer: father    SOCIAL HISTORY: independent    Allergies: penicillin (Angioedema) - tolerates oral cephalosporin (cefpedoxime)      PHYSICAL EXAM:  Vital Signs Last 24 Hrs  T(F): 97.6 (05 Oct 2019 09:03), Max: 98.2 (04 Oct 2019 17:52)  HR: 56 (05 Oct 2019 09:03) (52 - 89)  BP: 107/69 (05 Oct 2019 09:03) (105/68 - 159/85)  RR: 16 (05 Oct 2019 09:03) (15 - 17)  SpO2: 95% (05 Oct 2019 09:03) (95% - 100%)    General: WDWN, NAD  HEENT: PERRLA, EOMI, Normal Hearing  Neck: supple; no JVD, no bruit, thyroid normal  Nodes: no cervical, axillary, epitrochlear, or inguinal adenopathy  Thorax: normal breasts; no spinal or CVA tenderness  Respiratory: normal breath sounds; clear to P&A all fields  Cor reg, no M,G,R  Gastrointestinal: softly distended with 3+ direct tenderness LLQ; bowel sounds decreased  Extremities: No C,C,E  Vascular: 2+ peripheral pulses  Psychiatric: Normal mood, normal affect  Skin: No rashes      LABS:                        10.9   6.28  )-----------( 102      ( 05 Oct 2019 07:38 )             33.5     10-05    140  |  106  |  14  ----------------------------<  103<H>  3.9   |  24  |  0.87    Ca    8.3<L>      05 Oct 2019 07:38  Phos  4.1     10-05  Mg     2.2     10-05    TPro  7.7  /  Alb  4.3  /  TBili  0.9  /  DBili  <0.2  /  AST  24  /  ALT  29  /  AlkPhos  76  10-04    PT/INR - ( 04 Oct 2019 14:19 )   PT: 11.5 sec;   INR: 1.02          PTT - ( 04 Oct 2019 14:19 )  PTT:31.3 sec  Urinalysis Basic - ( 04 Oct 2019 14:19 )    Color: Yellow / Appearance: Clear / S.010 / pH: x  Gluc: x / Ketone: NEGATIVE  / Bili: Negative / Urobili: 0.2 E.U./dL   Blood: x / Protein: NEGATIVE mg/dL / Nitrite: NEGATIVE   Leuk Esterase: NEGATIVE / RBC: < 5 /HPF / WBC < 5 /HPF   Sq Epi: x / Non Sq Epi: 5-10 /HPF / Bacteria: Present /HPF    RADIOLOGY & ADDITIONAL STUDIES: noted above

## 2019-10-05 NOTE — CONSULT NOTE ADULT - ASSESSMENT
Impression:    Relapsed diverticulitis: pt states that she never really felt better, retaining the LLQ pain and tenderness.  Whether this is due to inadequate duration of Rx, or perhaps an ineffective antimicrobial combination is not really clear.  In any case, she seems to have had a good initial response to meropenem, judging by the fall in WBC overnight and lack of documented fever.    Recommend:  1. Continue meropenem at current dosage  2, Await Dr. Escalera's plan re: timing of surgery    Thank you, will follow

## 2019-10-06 LAB
ANION GAP SERPL CALC-SCNC: 11 MMOL/L — SIGNIFICANT CHANGE UP (ref 5–17)
BUN SERPL-MCNC: 13 MG/DL — SIGNIFICANT CHANGE UP (ref 7–23)
CALCIUM SERPL-MCNC: 8.1 MG/DL — LOW (ref 8.4–10.5)
CHLORIDE SERPL-SCNC: 110 MMOL/L — HIGH (ref 96–108)
CO2 SERPL-SCNC: 21 MMOL/L — LOW (ref 22–31)
CREAT SERPL-MCNC: 0.73 MG/DL — SIGNIFICANT CHANGE UP (ref 0.5–1.3)
GLUCOSE SERPL-MCNC: 92 MG/DL — SIGNIFICANT CHANGE UP (ref 70–99)
HCT VFR BLD CALC: 33.3 % — LOW (ref 34.5–45)
HGB BLD-MCNC: 10.9 G/DL — LOW (ref 11.5–15.5)
MAGNESIUM SERPL-MCNC: 2.1 MG/DL — SIGNIFICANT CHANGE UP (ref 1.6–2.6)
MCHC RBC-ENTMCNC: 31.9 PG — SIGNIFICANT CHANGE UP (ref 27–34)
MCHC RBC-ENTMCNC: 32.7 GM/DL — SIGNIFICANT CHANGE UP (ref 32–36)
MCV RBC AUTO: 97.4 FL — SIGNIFICANT CHANGE UP (ref 80–100)
NRBC # BLD: 0 /100 WBCS — SIGNIFICANT CHANGE UP (ref 0–0)
PHOSPHATE SERPL-MCNC: 3.6 MG/DL — SIGNIFICANT CHANGE UP (ref 2.5–4.5)
PLATELET # BLD AUTO: 111 K/UL — LOW (ref 150–400)
POTASSIUM SERPL-MCNC: 4 MMOL/L — SIGNIFICANT CHANGE UP (ref 3.5–5.3)
POTASSIUM SERPL-SCNC: 4 MMOL/L — SIGNIFICANT CHANGE UP (ref 3.5–5.3)
RBC # BLD: 3.42 M/UL — LOW (ref 3.8–5.2)
RBC # FLD: 12.7 % — SIGNIFICANT CHANGE UP (ref 10.3–14.5)
SODIUM SERPL-SCNC: 142 MMOL/L — SIGNIFICANT CHANGE UP (ref 135–145)
WBC # BLD: 7.5 K/UL — SIGNIFICANT CHANGE UP (ref 3.8–10.5)
WBC # FLD AUTO: 7.5 K/UL — SIGNIFICANT CHANGE UP (ref 3.8–10.5)

## 2019-10-06 RX ADMIN — Medication 400 MILLIGRAM(S): at 21:40

## 2019-10-06 RX ADMIN — Medication 30 MILLIGRAM(S): at 18:49

## 2019-10-06 RX ADMIN — HEPARIN SODIUM 5000 UNIT(S): 5000 INJECTION INTRAVENOUS; SUBCUTANEOUS at 05:29

## 2019-10-06 RX ADMIN — MEROPENEM 100 MILLIGRAM(S): 1 INJECTION INTRAVENOUS at 13:22

## 2019-10-06 RX ADMIN — Medication 30 MILLIGRAM(S): at 23:57

## 2019-10-06 RX ADMIN — Medication 30 MILLIGRAM(S): at 06:00

## 2019-10-06 RX ADMIN — Medication 30 MILLIGRAM(S): at 00:15

## 2019-10-06 RX ADMIN — Medication 30 MILLIGRAM(S): at 05:29

## 2019-10-06 RX ADMIN — HEPARIN SODIUM 5000 UNIT(S): 5000 INJECTION INTRAVENOUS; SUBCUTANEOUS at 21:39

## 2019-10-06 RX ADMIN — Medication 30 MILLIGRAM(S): at 11:58

## 2019-10-06 RX ADMIN — MEROPENEM 100 MILLIGRAM(S): 1 INJECTION INTRAVENOUS at 21:42

## 2019-10-06 RX ADMIN — Medication 30 MILLIGRAM(S): at 17:20

## 2019-10-06 RX ADMIN — MEROPENEM 100 MILLIGRAM(S): 1 INJECTION INTRAVENOUS at 05:29

## 2019-10-06 RX ADMIN — Medication 30 MILLIGRAM(S): at 11:08

## 2019-10-06 RX ADMIN — HEPARIN SODIUM 5000 UNIT(S): 5000 INJECTION INTRAVENOUS; SUBCUTANEOUS at 13:22

## 2019-10-07 ENCOUNTER — TRANSCRIPTION ENCOUNTER (OUTPATIENT)
Age: 59
End: 2019-10-07

## 2019-10-07 VITALS
OXYGEN SATURATION: 97 % | DIASTOLIC BLOOD PRESSURE: 88 MMHG | SYSTOLIC BLOOD PRESSURE: 161 MMHG | HEART RATE: 49 BPM | RESPIRATION RATE: 17 BRPM | TEMPERATURE: 98 F

## 2019-10-07 LAB
ANION GAP SERPL CALC-SCNC: 10 MMOL/L — SIGNIFICANT CHANGE UP (ref 5–17)
BUN SERPL-MCNC: 13 MG/DL — SIGNIFICANT CHANGE UP (ref 7–23)
CALCIUM SERPL-MCNC: 7.9 MG/DL — LOW (ref 8.4–10.5)
CHLORIDE SERPL-SCNC: 116 MMOL/L — HIGH (ref 96–108)
CO2 SERPL-SCNC: 21 MMOL/L — LOW (ref 22–31)
CREAT SERPL-MCNC: 0.7 MG/DL — SIGNIFICANT CHANGE UP (ref 0.5–1.3)
GLUCOSE SERPL-MCNC: 99 MG/DL — SIGNIFICANT CHANGE UP (ref 70–99)
HCT VFR BLD CALC: 32.1 % — LOW (ref 34.5–45)
HGB BLD-MCNC: 10.5 G/DL — LOW (ref 11.5–15.5)
MAGNESIUM SERPL-MCNC: 2.1 MG/DL — SIGNIFICANT CHANGE UP (ref 1.6–2.6)
MCHC RBC-ENTMCNC: 32.2 PG — SIGNIFICANT CHANGE UP (ref 27–34)
MCHC RBC-ENTMCNC: 32.7 GM/DL — SIGNIFICANT CHANGE UP (ref 32–36)
MCV RBC AUTO: 98.5 FL — SIGNIFICANT CHANGE UP (ref 80–100)
NRBC # BLD: 0 /100 WBCS — SIGNIFICANT CHANGE UP (ref 0–0)
PHOSPHATE SERPL-MCNC: 3 MG/DL — SIGNIFICANT CHANGE UP (ref 2.5–4.5)
PLATELET # BLD AUTO: 134 K/UL — LOW (ref 150–400)
POTASSIUM SERPL-MCNC: 3.8 MMOL/L — SIGNIFICANT CHANGE UP (ref 3.5–5.3)
POTASSIUM SERPL-SCNC: 3.8 MMOL/L — SIGNIFICANT CHANGE UP (ref 3.5–5.3)
RBC # BLD: 3.26 M/UL — LOW (ref 3.8–5.2)
RBC # FLD: 12.9 % — SIGNIFICANT CHANGE UP (ref 10.3–14.5)
SODIUM SERPL-SCNC: 147 MMOL/L — HIGH (ref 135–145)
WBC # BLD: 7.92 K/UL — SIGNIFICANT CHANGE UP (ref 3.8–10.5)
WBC # FLD AUTO: 7.92 K/UL — SIGNIFICANT CHANGE UP (ref 3.8–10.5)

## 2019-10-07 PROCEDURE — 96376 TX/PRO/DX INJ SAME DRUG ADON: CPT

## 2019-10-07 PROCEDURE — 84100 ASSAY OF PHOSPHORUS: CPT

## 2019-10-07 PROCEDURE — 93005 ELECTROCARDIOGRAM TRACING: CPT

## 2019-10-07 PROCEDURE — 74177 CT ABD & PELVIS W/CONTRAST: CPT

## 2019-10-07 PROCEDURE — 85025 COMPLETE CBC W/AUTO DIFF WBC: CPT

## 2019-10-07 PROCEDURE — 86900 BLOOD TYPING SEROLOGIC ABO: CPT

## 2019-10-07 PROCEDURE — 96375 TX/PRO/DX INJ NEW DRUG ADDON: CPT

## 2019-10-07 PROCEDURE — 83735 ASSAY OF MAGNESIUM: CPT

## 2019-10-07 PROCEDURE — 36569 INSJ PICC 5 YR+ W/O IMAGING: CPT

## 2019-10-07 PROCEDURE — 80053 COMPREHEN METABOLIC PANEL: CPT

## 2019-10-07 PROCEDURE — 36410 VNPNXR 3YR/> PHY/QHP DX/THER: CPT

## 2019-10-07 PROCEDURE — 96374 THER/PROPH/DIAG INJ IV PUSH: CPT | Mod: XU

## 2019-10-07 PROCEDURE — 76937 US GUIDE VASCULAR ACCESS: CPT

## 2019-10-07 PROCEDURE — 85027 COMPLETE CBC AUTOMATED: CPT

## 2019-10-07 PROCEDURE — 76937 US GUIDE VASCULAR ACCESS: CPT | Mod: 26,59

## 2019-10-07 PROCEDURE — 85610 PROTHROMBIN TIME: CPT

## 2019-10-07 PROCEDURE — 96361 HYDRATE IV INFUSION ADD-ON: CPT

## 2019-10-07 PROCEDURE — 85730 THROMBOPLASTIN TIME PARTIAL: CPT

## 2019-10-07 PROCEDURE — 83690 ASSAY OF LIPASE: CPT

## 2019-10-07 PROCEDURE — 80048 BASIC METABOLIC PNL TOTAL CA: CPT

## 2019-10-07 PROCEDURE — 99285 EMERGENCY DEPT VISIT HI MDM: CPT | Mod: 25

## 2019-10-07 PROCEDURE — 80076 HEPATIC FUNCTION PANEL: CPT

## 2019-10-07 PROCEDURE — 86901 BLOOD TYPING SEROLOGIC RH(D): CPT

## 2019-10-07 PROCEDURE — 81001 URINALYSIS AUTO W/SCOPE: CPT

## 2019-10-07 PROCEDURE — 86850 RBC ANTIBODY SCREEN: CPT

## 2019-10-07 PROCEDURE — 36415 COLL VENOUS BLD VENIPUNCTURE: CPT

## 2019-10-07 RX ORDER — ERTAPENEM SODIUM 1 G/1
1000 INJECTION, POWDER, LYOPHILIZED, FOR SOLUTION INTRAMUSCULAR; INTRAVENOUS EVERY 24 HOURS
Refills: 0 | Status: DISCONTINUED | OUTPATIENT
Start: 2019-10-07 | End: 2019-10-07

## 2019-10-07 RX ORDER — ERTAPENEM SODIUM 1 G/1
1000 INJECTION, POWDER, LYOPHILIZED, FOR SOLUTION INTRAMUSCULAR; INTRAVENOUS EVERY 24 HOURS
Refills: 0 | Status: COMPLETED | OUTPATIENT
Start: 2019-10-07 | End: 2019-10-07

## 2019-10-07 RX ADMIN — Medication 30 MILLIGRAM(S): at 12:34

## 2019-10-07 RX ADMIN — Medication 30 MILLIGRAM(S): at 05:17

## 2019-10-07 RX ADMIN — ERTAPENEM SODIUM 120 MILLIGRAM(S): 1 INJECTION, POWDER, LYOPHILIZED, FOR SOLUTION INTRAMUSCULAR; INTRAVENOUS at 12:12

## 2019-10-07 RX ADMIN — MEROPENEM 100 MILLIGRAM(S): 1 INJECTION INTRAVENOUS at 05:17

## 2019-10-07 RX ADMIN — Medication 30 MILLIGRAM(S): at 00:30

## 2019-10-07 RX ADMIN — HEPARIN SODIUM 5000 UNIT(S): 5000 INJECTION INTRAVENOUS; SUBCUTANEOUS at 05:17

## 2019-10-07 RX ADMIN — Medication 30 MILLIGRAM(S): at 12:13

## 2019-10-07 NOTE — DISCHARGE NOTE PROVIDER - NSDCCPCAREPLAN_GEN_ALL_CORE_FT
PRINCIPAL DISCHARGE DIAGNOSIS  Diagnosis: Diverticulitis of large intestine without perforation or abscess, unspecified bleeding status  Assessment and Plan of Treatment:

## 2019-10-07 NOTE — DISCHARGE NOTE NURSING/CASE MANAGEMENT/SOCIAL WORK - PATIENT PORTAL LINK FT
You can access the FollowMyHealth Patient Portal offered by [facility] by registering at the following website: [email or web address]. By joining [employer]’s FollowMyHealth portal, you will also be able to view your health information using other applications (apps) compatible with our system.

## 2019-10-07 NOTE — DISCHARGE NOTE PROVIDER - CARE PROVIDER_API CALL
Asif Escalera)  ColonRectal Surgery  [address], [address]  [place] [address]  Phone: [phone]  Fax: [phone]  Follow Up Time:

## 2019-10-07 NOTE — PROGRESS NOTE ADULT - ASSESSMENT
59F PMH recurrent diverticulitis first treated end of July with 10 days of cipro/flagyl, recent admission 8/26-8/30 with questionable perforated diverticulitis and 1.5cm abscess treated with cefpodoxime/flagyl per ID 2/2 confirmed PCN allergy, presents with diverticulitis    - Pain/nausea PRN  - NPO/IVF  - Meropenem, as per ID  - OOBA/SCDs/SQH  - AM labs
59F PMH recurrent diverticulitis first treated end of July with 10 days of cipro/flagyl, recent admission 8/26-8/30 with questionable perforated diverticulitis and 1.5cm abscess treated with cefpodoxime/flagyl per ID 2/2 confirmed PCN allergy, presents with diverticulitis    - Pain/nausea PRN  - NPO/IVF  - Meropenem, as per ID  - OOBA/SCDs/SQH  - AM labs
Impression:  minimal improvement thus far.  I am reluctant to switch her once again to oral antibiotics, since failure to improve would possibly delay the surgery that she really needs.  Thus, I would like to send her home on intravenous ertapenem for 12 more days in an attempt to give her the best chance to have this diverticulitis quiet down sufficiently for s=urgery to be done.    Recommend:  1. D/C meropenem  2. Ertapenem 1 gram iv qd x 12 more days  3. pt will need a PICL or midline    Will follow
59F PMH recurrent diverticulitis first treated end of July with 10 days of cipro/flagyl, recent admission 8/26-8/30 with questionable perforated diverticulitis and 1.5cm abscess treated with cefpodoxime/flagyl per ID 2/2 confirmed PCN allergy, presents with diverticulitis.     - Pain/nausea PRN  - PICC line today  - LRD/IVF  - Meropenem until DC, then ertapenem  - OOBA/SCDs/SQH  - AM labs

## 2019-10-07 NOTE — PROGRESS NOTE ADULT - SUBJECTIVE AND OBJECTIVE BOX
INTERVAL HPI/OVERNIGHT EVENTS:    Tolerating meropenem.    Perhaps slight reduction of LLQ pain; she still c/o waves of generalized abdominal discomfort    No BM yet    Plan is for D/C home and surgery in 2 weeks    ANTIBIOTICS/RELEVANT:    meropenem  IVPB 1000 milliGRAM(s) IV Intermittent every 8 hours    Allergies:  penicillin (Angioedema)    PHYSICAL EXAMINATION:    Vital Signs Last 24 Hrs  T(C): 36.8 (06 Oct 2019 08:39), Max: 37 (05 Oct 2019 20:15)  T(F): 98.3 (06 Oct 2019 08:39), Max: 98.6 (05 Oct 2019 20:15)  HR: 62 (06 Oct 2019 08:39) (56 - 72)  BP: 131/85 (06 Oct 2019 08:39) (110/72 - 135/85)  RR: 17 (06 Oct 2019 08:39) (17 - 18)  SpO2: 96% (06 Oct 2019 08:39) (95% - 96%)    Alert, apprehensive  NAD  Chest clear  Cor reg  Abd active BS; 3+ direct tenderness LLQ, no rebound    LABS:                        10.9   7.50  )-----------( 111      ( 06 Oct 2019 06:41 )             33.3     10-06    142  |  110<H>  |  13  ----------------------------<  92  4.0   |  21<L>  |  0.73    Ca    8.1<L>      06 Oct 2019 06:41  Phos  3.6     10-06  Mg     2.1     10-06    TPro  7.7  /  Alb  4.3  /  TBili  0.9  /  DBili  <0.2  /  AST  24  /  ALT  29  /  AlkPhos  76  10-04    PT/INR - ( 04 Oct 2019 14:19 )   PT: 11.5 sec;   INR: 1.02          PTT - ( 04 Oct 2019 14:19 )  PTT:31.3 sec  Urinalysis Basic - ( 04 Oct 2019 14:19 )    Color: Yellow / Appearance: Clear / S.010 / pH: x  Gluc: x / Ketone: NEGATIVE  / Bili: Negative / Urobili: 0.2 E.U./dL   Blood: x / Protein: NEGATIVE mg/dL / Nitrite: NEGATIVE   Leuk Esterase: NEGATIVE / RBC: < 5 /HPF / WBC < 5 /HPF   Sq Epi: x / Non Sq Epi: 5-10 /HPF / Bacteria: Present /HPF        MICROBIOLOGY:    RADIOLOGY & ADDITIONAL STUDIES:
INTERVAL HPI/OVERNIGHT EVENTS: Admitted overnight, pain controlled. Denies any flatus or BM. Feels bloated and is having some nausea. No vomiting.       MEDICATIONS  (STANDING):  heparin  Injectable 5000 Unit(s) SubCutaneous every 8 hours  influenza   Vaccine 0.5 milliLiter(s) IntraMuscular once  ketorolac   Injectable 30 milliGRAM(s) IV Push every 6 hours  meropenem  IVPB 1000 milliGRAM(s) IV Intermittent every 8 hours  sodium chloride 0.9%. 1000 milliLiter(s) (110 mL/Hr) IV Continuous <Continuous>  traZODone 400 milliGRAM(s) Oral at bedtime    MEDICATIONS  (PRN):  ondansetron Injectable 4 milliGRAM(s) IV Push every 6 hours PRN Nausea      Vital Signs Last 24 Hrs  T(C): 36.6 (05 Oct 2019 05:36), Max: 36.8 (04 Oct 2019 17:52)  T(F): 97.9 (05 Oct 2019 05:36), Max: 98.2 (04 Oct 2019 17:52)  HR: 52 (05 Oct 2019 05:36) (52 - 89)  BP: 105/68 (05 Oct 2019 05:36) (105/68 - 159/85)  BP(mean): --  RR: 15 (05 Oct 2019 05:36) (15 - 17)  SpO2: 95% (05 Oct 2019 05:36) (95% - 100%)    PHYSICAL EXAM:      Constitutional: A&Ox3    Respiratory: non labored breathing, no respiratory distress    Cardiovascular: NSR, RRR    Gastrointestinal: abdomen is soft, moderately tender to palpation mostly in the LLQ, no rebound or guarding.     Extremities: (-) edema                  I&O's Detail    04 Oct 2019 07:01  -  05 Oct 2019 07:00  --------------------------------------------------------  IN:    sodium chloride 0.9%.: 1210 mL    Solution: 50 mL  Total IN: 1260 mL    OUT:    Voided: 200 mL  Total OUT: 200 mL    Total NET: 1060 mL      05 Oct 2019 07:01  -  05 Oct 2019 07:30  --------------------------------------------------------  IN:    sodium chloride 0.9%.: 110 mL  Total IN: 110 mL    OUT:  Total OUT: 0 mL    Total NET: 110 mL          LABS:                        12.9   12.62 )-----------( 128      ( 04 Oct 2019 14:19 )             39.1     10-04    140  |  102  |  16  ----------------------------<  111<H>  4.4   |  26  |  0.85    Ca    9.6      04 Oct 2019 14:19    TPro  7.7  /  Alb  4.3  /  TBili  0.9  /  DBili  <0.2  /  AST  24  /  ALT  29  /  AlkPhos  76  10-04    PT/INR - ( 04 Oct 2019 14:19 )   PT: 11.5 sec;   INR: 1.02          PTT - ( 04 Oct 2019 14:19 )  PTT:31.3 sec  Urinalysis Basic - ( 04 Oct 2019 14:19 )    Color: Yellow / Appearance: Clear / S.010 / pH: x  Gluc: x / Ketone: NEGATIVE  / Bili: Negative / Urobili: 0.2 E.U./dL   Blood: x / Protein: NEGATIVE mg/dL / Nitrite: NEGATIVE   Leuk Esterase: NEGATIVE / RBC: < 5 /HPF / WBC < 5 /HPF   Sq Epi: x / Non Sq Epi: 5-10 /HPF / Bacteria: Present /HPF        RADIOLOGY & ADDITIONAL STUDIES:
INTERVAL HPI/OVERNIGHT EVENTS: No acute complaints. Mild pain but otherwise controlled. Reports flatus but no BM. Denies N/V.      MEDICATIONS  (STANDING):  heparin  Injectable 5000 Unit(s) SubCutaneous every 8 hours  influenza   Vaccine 0.5 milliLiter(s) IntraMuscular once  ketorolac   Injectable 30 milliGRAM(s) IV Push every 6 hours  meropenem  IVPB 1000 milliGRAM(s) IV Intermittent every 8 hours  sodium chloride 0.9%. 1000 milliLiter(s) (110 mL/Hr) IV Continuous <Continuous>  traZODone 400 milliGRAM(s) Oral at bedtime    MEDICATIONS  (PRN):  ondansetron Injectable 4 milliGRAM(s) IV Push every 6 hours PRN Nausea        Vital Signs Last 24 Hrs  T(C): 36.1 (06 Oct 2019 05:46), Max: 37 (05 Oct 2019 20:15)  T(F): 97 (06 Oct 2019 05:46), Max: 98.6 (05 Oct 2019 20:15)  HR: 59 (06 Oct 2019 05:46) (56 - 72)  BP: 135/85 (06 Oct 2019 05:46) (107/69 - 135/85)  RR: 17 (06 Oct 2019 05:46) (16 - 18)  SpO2: 95% (06 Oct 2019 05:46) (95% - 96%)        PHYSICAL EXAM:    Constitutional: A&Ox3  Respiratory: non labored breathing, no respiratory distress  Gastrointestinal: abdomen is soft, moderately tender to palpation mostly in the LLQ, no rebound or guarding.   Extremities: WWP, (-) edema          I&O's Detail    05 Oct 2019 07:01  -  06 Oct 2019 07:00  --------------------------------------------------------  IN:    sodium chloride 0.9%.: 1430 mL  Total IN: 1430 mL    OUT:    Voided: 730 mL  Total OUT: 730 mL    Total NET: 700 mL            LABS:          10-06    142  |  110<H>  |  13  ----------------------------<  92  4.0   |  21<L>  |  0.73    Ca    8.1<L>      06 Oct 2019 06:41  Phos  3.6     10-06  Mg     2.1     10-06    TPro  7.7  /  Alb  4.3  /  TBili  0.9  /  DBili  <0.2  /  AST  24  /  ALT  29  /  AlkPhos  76  10-04                          10.9   7.50  )-----------( 111      ( 06 Oct 2019 06:41 )             33.3
improving  tolerating low residue  AVSS  ABD soft  tender in LLQ    PICC  IV INVANZ as outpatient  F/U next week as outpatient
Subjective: Pt seen and examined, reports improvement in her pain since yesterday. Tolerating LRD w/o N/V. +flatus +BM. No acute complaints.      Vitals: T(F): 97.9 (10-07-19 @ 08:33), Max: 98.7 (10-06-19 @ 16:36)  HR: 65 (10-07-19 @ 08:33)  BP: 155/88 (10-07-19 @ 08:33)  RR: 18 (10-07-19 @ 08:33)  SpO2: 94% (10-07-19 @ 08:33)      Diet, Low Fiber      10-06-19 @ 07:01  -  10-07-19 @ 07:00  --------------------------------------------------------  IN:    Oral Fluid: 1580 mL    sodium chloride 0.9%.: 1320 mL  Total IN: 2900 mL    OUT:    Voided: 1750 mL  Total OUT: 1750 mL    Total NET: 1150 mL      PHYSICAL EXAM  General: NAD. Resting comfortably.  Pulmonary: Non-labored breathing. No respiratory distress.  Abdomen: Soft, non-distended, tender to palpation LLQ.  Extremities: WWP. No C/C/E      MEDICATIONS  (STANDING):  heparin  Injectable 5000 Unit(s) SubCutaneous every 8 hours  influenza   Vaccine 0.5 milliLiter(s) IntraMuscular once  ketorolac   Injectable 30 milliGRAM(s) IV Push every 6 hours  meropenem  IVPB 1000 milliGRAM(s) IV Intermittent every 8 hours  sodium chloride 0.9%. 1000 milliLiter(s) (110 mL/Hr) IV Continuous <Continuous>  traZODone 400 milliGRAM(s) Oral at bedtime    MEDICATIONS  (PRN):  ondansetron Injectable 4 milliGRAM(s) IV Push every 6 hours PRN Nausea          LABS:                        10.5   7.92  )--------( 134      ( 07 Oct 2019 07:05 )             32.1     10-07    147<H>  |  116<H>  |  13  ----------------------------<  99  3.8   |  21<L>  |  0.70    Ca    7.9<L>      07 Oct 2019 07:05  Phos  3.0     10-07  Mg     2.1     10-07

## 2019-10-07 NOTE — DISCHARGE NOTE PROVIDER - NSDCFUADDINST_GEN_ALL_CORE_FT
- Please follow up with Dr. Escalera in 1-2 weeks or sooner if your symptoms worsen    - Return to ED for fever (temperature >100.4F), chills, severe or increasing abdominal pain, persistent nausea/vomiting, chest pain, shortness of breath    - Resume home medications as prescribed - Please follow up with Dr. Escalera in 1-2 weeks or sooner if your symptoms worsen    - Return to ED for fever (temperature >100.4F), chills, severe or increasing abdominal pain, persistent nausea/vomiting, chest pain, shortness of breath    - Resume home medications as prescribed    - Ensure IV antibiotics are administered daily as prescribed

## 2019-10-07 NOTE — CONSULT NOTE ADULT - SUBJECTIVE AND OBJECTIVE BOX
Vascular Access Service Consult Note    59yFemaleHEALTH ISSUES - PROBLEM Dx:             Diagnosis: diverticulitis     Indications for Vascular Access (Check all that apply)  [X  ]  Antibiotic Therapy       Antibiotic Prescribed: ertapenem                                                                             Expected Duration of Therapy: 3 weeks              [  ]  IV Hydration  [  ]  Total Parenteral Nutrition  [  ]  Chemotherapy  [  ]  Difficult Venous Access  [  ]  CVP monitoring  [  ]  Medications with high potential for tissue necrosis on extravasation  [  ]  Other    Screening (Check all that apply)  Previous Radiation to chest  [  ] Yes      [  X]  No  Breast Cancer                          [  ] Left     [  ]  Right    [X ]  No  Lymph Node Dissection         [  ] Left     [  ]  Right    [X  ]  No  Pacemaker or ICD                   [  ] Left     [  ]  Right    [ X ]  No  Upper Extremity DVT             [  ] Left     [  ]  Right    [X  ]  No  Chronic Kidney Disease         [  ]  Yes     [  X]  No  Hemodialysis                           [  ]  Yes     [ X ]  No  AV Fistula/ Graft                     [  ]  Left    [  ]  Right    [X  ]  No  Temp>101F in past 24 H       [  ]  Yes     [ X ]  No  H/O PICC/Midline                   [  ]  Yes     [ X ]  No    Lab data:                        10.5   7.92  )-----------( 134      ( 07 Oct 2019 07:05 )             32.1     10-07    147<H>  |  116<H>  |  13  ----------------------------<  99  3.8   |  21<L>  |  0.70    Ca    7.9<L>      07 Oct 2019 07:05  Phos  3.0     10-07  Mg     2.1     10-07                I have reviewed the chart, interviewed and examined the patient and determined that this patient:  [  ] Is a candidate for a PICC line  [ X ] Is a candidate for a Midline  [  ] Is not a candidate for vascular access device (reason)    Lumens:    [ X ] Single  [  ] Double

## 2019-10-07 NOTE — DISCHARGE NOTE PROVIDER - HOSPITAL COURSE
59F PMH with 2 prior episodes of diverticulitis presented again for recurrence of diverticulitis. She was started on IV meropenem. She has been having improvement of her abdominal pain and has been tolerating diet advancements. Midline was placed on 10/7 for 3 week of antibiotic infusions outpatient. She will be returning in 3 weeks for surgery. Patient is now stable and ready for discharge home with IV antibiotics.

## 2019-10-07 NOTE — PROCEDURE NOTE - NSPROCDETAILS_GEN_ALL_CORE
sterile dressing applied/sterile technique, catheter placed/location identified, draped/prepped, sterile technique used/supine position

## 2019-10-09 DIAGNOSIS — Z90.49 ACQUIRED ABSENCE OF OTHER SPECIFIED PARTS OF DIGESTIVE TRACT: ICD-10-CM

## 2019-10-09 DIAGNOSIS — Z87.891 PERSONAL HISTORY OF NICOTINE DEPENDENCE: ICD-10-CM

## 2019-10-09 DIAGNOSIS — K57.32 DIVERTICULITIS OF LARGE INTESTINE WITHOUT PERFORATION OR ABSCESS WITHOUT BLEEDING: ICD-10-CM

## 2019-10-09 DIAGNOSIS — R10.32 LEFT LOWER QUADRANT PAIN: ICD-10-CM

## 2019-10-22 PROBLEM — K57.80 DIVERTICULITIS OF INTESTINE, PART UNSPECIFIED, WITH PERFORATION AND ABSCESS WITHOUT BLEEDING: Chronic | Status: ACTIVE | Noted: 2019-10-04

## 2020-06-30 PROBLEM — Z00.00 ENCOUNTER FOR PREVENTIVE HEALTH EXAMINATION: Status: ACTIVE | Noted: 2020-06-30

## 2020-07-07 ENCOUNTER — APPOINTMENT (OUTPATIENT)
Dept: CARDIOLOGY | Facility: CLINIC | Age: 60
End: 2020-07-07

## 2020-12-23 ENCOUNTER — APPOINTMENT (OUTPATIENT)
Dept: CARDIOLOGY | Facility: CLINIC | Age: 60
End: 2020-12-23
Payer: COMMERCIAL

## 2020-12-23 ENCOUNTER — TRANSCRIPTION ENCOUNTER (OUTPATIENT)
Age: 60
End: 2020-12-23

## 2020-12-23 PROCEDURE — 99072 ADDL SUPL MATRL&STAF TM PHE: CPT

## 2020-12-23 PROCEDURE — 93306 TTE W/DOPPLER COMPLETE: CPT

## 2021-01-06 ENCOUNTER — APPOINTMENT (OUTPATIENT)
Dept: CARDIOLOGY | Facility: CLINIC | Age: 61
End: 2021-01-06
Payer: COMMERCIAL

## 2021-01-06 DIAGNOSIS — R07.9 CHEST PAIN, UNSPECIFIED: ICD-10-CM

## 2021-01-06 PROCEDURE — 93015 CV STRESS TEST SUPVJ I&R: CPT

## 2021-01-06 PROCEDURE — 99072 ADDL SUPL MATRL&STAF TM PHE: CPT

## 2021-03-18 ENCOUNTER — APPOINTMENT (OUTPATIENT)
Dept: CARDIOLOGY | Facility: CLINIC | Age: 61
End: 2021-03-18

## 2021-06-09 NOTE — DISCHARGE NOTE PROVIDER - NSCORESITESY/N_GEN_A_CORE_RD
What Type Of Note Output Would You Prefer (Optional)?: Standard Output Hpi Title: Evaluation of Skin Lesions How Severe Are Your Spot(S)?: mild No Have Your Spot(S) Been Treated In The Past?: has not been treated

## 2021-10-15 ENCOUNTER — TRANSCRIPTION ENCOUNTER (OUTPATIENT)
Age: 61
End: 2021-10-15

## 2024-04-29 NOTE — ED ADULT TRIAGE NOTE - PAIN: PRESENCE, MLM
Patient did contact her insurance about the in home physical therapy and was given a couple of names. She will call us back next week and let us know who is in network and in her area.   complains of pain/discomfort
